# Patient Record
Sex: MALE | Race: WHITE | NOT HISPANIC OR LATINO
[De-identification: names, ages, dates, MRNs, and addresses within clinical notes are randomized per-mention and may not be internally consistent; named-entity substitution may affect disease eponyms.]

---

## 2017-06-07 ENCOUNTER — MEDICATION RENEWAL (OUTPATIENT)
Age: 57
End: 2017-06-07

## 2017-06-09 ENCOUNTER — APPOINTMENT (OUTPATIENT)
Dept: INTERNAL MEDICINE | Facility: CLINIC | Age: 57
End: 2017-06-09

## 2017-06-09 ENCOUNTER — LABORATORY RESULT (OUTPATIENT)
Age: 57
End: 2017-06-09

## 2017-06-09 VITALS — SYSTOLIC BLOOD PRESSURE: 132 MMHG | DIASTOLIC BLOOD PRESSURE: 84 MMHG

## 2017-06-09 VITALS
TEMPERATURE: 98.5 F | WEIGHT: 268 LBS | SYSTOLIC BLOOD PRESSURE: 138 MMHG | RESPIRATION RATE: 14 BRPM | DIASTOLIC BLOOD PRESSURE: 88 MMHG | BODY MASS INDEX: 36.3 KG/M2 | HEIGHT: 72 IN | OXYGEN SATURATION: 98 % | HEART RATE: 75 BPM

## 2017-06-13 LAB
ALBUMIN SERPL ELPH-MCNC: 4.9 G/DL
ALP BLD-CCNC: 85 U/L
ALT SERPL-CCNC: 43 U/L
ANION GAP SERPL CALC-SCNC: 17 MMOL/L
APPEARANCE: ABNORMAL
AST SERPL-CCNC: 26 U/L
BASOPHILS # BLD AUTO: 0.03 K/UL
BASOPHILS NFR BLD AUTO: 0.4 %
BILIRUB SERPL-MCNC: 0.8 MG/DL
BILIRUBIN URINE: NEGATIVE
BLOOD URINE: NEGATIVE
BUN SERPL-MCNC: 17 MG/DL
CALCIUM SERPL-MCNC: 9.9 MG/DL
CHLORIDE SERPL-SCNC: 101 MMOL/L
CHOLEST SERPL-MCNC: 153 MG/DL
CHOLEST/HDLC SERPL: 3.7 RATIO
CO2 SERPL-SCNC: 23 MMOL/L
COLOR: YELLOW
CREAT SERPL-MCNC: 0.9 MG/DL
EOSINOPHIL # BLD AUTO: 0.11 K/UL
EOSINOPHIL NFR BLD AUTO: 1.4 %
ESTIMATED AVERAGE GLUCOSE: 174 MG/DL
FOLATE SERPL-MCNC: 16 NG/ML
GLUCOSE QUALITATIVE U: 500 MG/DL
GLUCOSE SERPL-MCNC: 219 MG/DL
HBA1C MFR BLD HPLC: 7.7 %
HCT VFR BLD CALC: 43.6 %
HDLC SERPL-MCNC: 41 MG/DL
HGB BLD-MCNC: 14.5 G/DL
IMM GRANULOCYTES NFR BLD AUTO: 0.4 %
KETONES URINE: ABNORMAL
LDLC SERPL CALC-MCNC: 68 MG/DL
LEUKOCYTE ESTERASE URINE: NEGATIVE
LYMPHOCYTES # BLD AUTO: 2.28 K/UL
LYMPHOCYTES NFR BLD AUTO: 28.9 %
MAN DIFF?: NORMAL
MCHC RBC-ENTMCNC: 28.7 PG
MCHC RBC-ENTMCNC: 33.3 GM/DL
MCV RBC AUTO: 86.2 FL
MONOCYTES # BLD AUTO: 0.6 K/UL
MONOCYTES NFR BLD AUTO: 7.6 %
NEUTROPHILS # BLD AUTO: 4.83 K/UL
NEUTROPHILS NFR BLD AUTO: 61.3 %
NITRITE URINE: NEGATIVE
PH URINE: 5.5
PLATELET # BLD AUTO: 256 K/UL
POTASSIUM SERPL-SCNC: 4.5 MMOL/L
PROT SERPL-MCNC: 7.6 G/DL
PROTEIN URINE: ABNORMAL MG/DL
PSA SERPL-MCNC: 0.37 NG/ML
RBC # BLD: 5.06 M/UL
RBC # FLD: 13.6 %
SODIUM SERPL-SCNC: 141 MMOL/L
SPECIFIC GRAVITY URINE: 1.03
TRIGL SERPL-MCNC: 219 MG/DL
TSH SERPL-ACNC: 1.17 UIU/ML
UROBILINOGEN URINE: NORMAL MG/DL
VIT B12 SERPL-MCNC: 557 PG/ML
WBC # FLD AUTO: 7.88 K/UL

## 2017-07-12 ENCOUNTER — MEDICATION RENEWAL (OUTPATIENT)
Age: 57
End: 2017-07-12

## 2017-08-14 ENCOUNTER — MEDICATION RENEWAL (OUTPATIENT)
Age: 57
End: 2017-08-14

## 2017-08-29 ENCOUNTER — APPOINTMENT (OUTPATIENT)
Dept: ENDOCRINOLOGY | Facility: CLINIC | Age: 57
End: 2017-08-29

## 2017-09-09 ENCOUNTER — RX RENEWAL (OUTPATIENT)
Age: 57
End: 2017-09-09

## 2017-09-11 ENCOUNTER — MEDICATION RENEWAL (OUTPATIENT)
Age: 57
End: 2017-09-11

## 2017-09-18 ENCOUNTER — RX RENEWAL (OUTPATIENT)
Age: 57
End: 2017-09-18

## 2017-09-29 ENCOUNTER — APPOINTMENT (OUTPATIENT)
Dept: ENDOCRINOLOGY | Facility: CLINIC | Age: 57
End: 2017-09-29
Payer: COMMERCIAL

## 2017-09-29 VITALS
OXYGEN SATURATION: 97 % | SYSTOLIC BLOOD PRESSURE: 130 MMHG | BODY MASS INDEX: 35.89 KG/M2 | WEIGHT: 265 LBS | DIASTOLIC BLOOD PRESSURE: 90 MMHG | HEIGHT: 72 IN | HEART RATE: 73 BPM

## 2017-09-29 LAB
GLUCOSE BLDC GLUCOMTR-MCNC: 227
HBA1C MFR BLD HPLC: 6.3

## 2017-09-29 PROCEDURE — 90686 IIV4 VACC NO PRSV 0.5 ML IM: CPT

## 2017-09-29 PROCEDURE — G0008: CPT

## 2017-09-29 PROCEDURE — 83036 HEMOGLOBIN GLYCOSYLATED A1C: CPT | Mod: QW

## 2017-09-29 PROCEDURE — 99205 OFFICE O/P NEW HI 60 MIN: CPT | Mod: 25

## 2017-09-29 PROCEDURE — 82962 GLUCOSE BLOOD TEST: CPT

## 2017-09-29 RX ORDER — LANCETS
EACH MISCELLANEOUS
Qty: 2 | Refills: 5 | Status: ACTIVE | COMMUNITY
Start: 2017-09-29 | End: 1900-01-01

## 2017-09-29 RX ORDER — BLOOD SUGAR DIAGNOSTIC
STRIP MISCELLANEOUS
Qty: 2 | Refills: 6 | Status: ACTIVE | COMMUNITY
Start: 2017-09-29 | End: 1900-01-01

## 2017-09-29 RX ORDER — LANCING DEVICE/LANCETS
KIT MISCELLANEOUS
Qty: 1 | Refills: 0 | Status: ACTIVE | COMMUNITY
Start: 2017-09-29 | End: 1900-01-01

## 2017-09-29 RX ORDER — LANCETS
EACH MISCELLANEOUS
Qty: 1 | Refills: 6 | Status: ACTIVE | COMMUNITY
Start: 2017-09-29 | End: 1900-01-01

## 2017-10-04 ENCOUNTER — RESULT REVIEW (OUTPATIENT)
Age: 57
End: 2017-10-04

## 2017-10-04 LAB
CREAT SPEC-SCNC: 113 MG/DL
MICROALBUMIN 24H UR DL<=1MG/L-MCNC: 6.5 MG/DL
MICROALBUMIN/CREAT 24H UR-RTO: 58 MG/G

## 2017-10-09 ENCOUNTER — MEDICATION RENEWAL (OUTPATIENT)
Age: 57
End: 2017-10-09

## 2017-10-09 ENCOUNTER — RX RENEWAL (OUTPATIENT)
Age: 57
End: 2017-10-09

## 2017-10-26 ENCOUNTER — RESULT REVIEW (OUTPATIENT)
Age: 57
End: 2017-10-26

## 2017-10-27 ENCOUNTER — APPOINTMENT (OUTPATIENT)
Dept: INTERNAL MEDICINE | Facility: CLINIC | Age: 57
End: 2017-10-27
Payer: COMMERCIAL

## 2017-10-27 ENCOUNTER — LABORATORY RESULT (OUTPATIENT)
Age: 57
End: 2017-10-27

## 2017-10-27 ENCOUNTER — NON-APPOINTMENT (OUTPATIENT)
Age: 57
End: 2017-10-27

## 2017-10-27 VITALS — DIASTOLIC BLOOD PRESSURE: 80 MMHG | SYSTOLIC BLOOD PRESSURE: 130 MMHG

## 2017-10-27 VITALS
RESPIRATION RATE: 14 BRPM | TEMPERATURE: 98 F | DIASTOLIC BLOOD PRESSURE: 90 MMHG | BODY MASS INDEX: 34.67 KG/M2 | SYSTOLIC BLOOD PRESSURE: 140 MMHG | WEIGHT: 256 LBS | HEART RATE: 73 BPM | HEIGHT: 72 IN | OXYGEN SATURATION: 98 %

## 2017-10-27 LAB
APPEARANCE: CLEAR
BILIRUBIN URINE: NEGATIVE
BLOOD URINE: NEGATIVE
COLOR: YELLOW
GLUCOSE QUALITATIVE U: NEGATIVE MG/DL
KETONES URINE: NEGATIVE
LEUKOCYTE ESTERASE URINE: NEGATIVE
NITRITE URINE: NEGATIVE
PH URINE: 5
PROTEIN URINE: ABNORMAL MG/DL
SPECIFIC GRAVITY URINE: 1.02
UROBILINOGEN URINE: NEGATIVE MG/DL

## 2017-10-27 PROCEDURE — 36415 COLL VENOUS BLD VENIPUNCTURE: CPT

## 2017-10-27 PROCEDURE — 99396 PREV VISIT EST AGE 40-64: CPT | Mod: 25

## 2017-10-27 PROCEDURE — 93000 ELECTROCARDIOGRAM COMPLETE: CPT

## 2017-10-29 LAB
ALBUMIN SERPL ELPH-MCNC: 4.7 G/DL
ALP BLD-CCNC: 75 U/L
ALT SERPL-CCNC: 39 U/L
ANION GAP SERPL CALC-SCNC: 18 MMOL/L
AST SERPL-CCNC: 31 U/L
BASOPHILS # BLD AUTO: 0.02 K/UL
BASOPHILS NFR BLD AUTO: 0.3 %
BILIRUB SERPL-MCNC: 0.8 MG/DL
BUN SERPL-MCNC: 17 MG/DL
CALCIUM SERPL-MCNC: 10.5 MG/DL
CHLORIDE SERPL-SCNC: 100 MMOL/L
CHOLEST SERPL-MCNC: 148 MG/DL
CHOLEST/HDLC SERPL: 3 RATIO
CO2 SERPL-SCNC: 24 MMOL/L
CREAT SERPL-MCNC: 1.01 MG/DL
EOSINOPHIL # BLD AUTO: 0.3 K/UL
EOSINOPHIL NFR BLD AUTO: 4 %
ESTIMATED AVERAGE GLUCOSE: 128 MG/DL
FOLATE SERPL-MCNC: 14.3 NG/ML
GLUCOSE SERPL-MCNC: 160 MG/DL
HBA1C MFR BLD HPLC: 6.1 %
HCT VFR BLD CALC: 43.3 %
HDLC SERPL-MCNC: 49 MG/DL
HGB BLD-MCNC: 14.6 G/DL
IMM GRANULOCYTES NFR BLD AUTO: 0.5 %
LDLC SERPL CALC-MCNC: 74 MG/DL
LYMPHOCYTES # BLD AUTO: 1.99 K/UL
LYMPHOCYTES NFR BLD AUTO: 26.4 %
MAN DIFF?: NORMAL
MCHC RBC-ENTMCNC: 29.1 PG
MCHC RBC-ENTMCNC: 33.7 GM/DL
MCV RBC AUTO: 86.4 FL
MONOCYTES # BLD AUTO: 0.58 K/UL
MONOCYTES NFR BLD AUTO: 7.7 %
NEUTROPHILS # BLD AUTO: 4.61 K/UL
NEUTROPHILS NFR BLD AUTO: 61.1 %
PLATELET # BLD AUTO: 244 K/UL
POTASSIUM SERPL-SCNC: 4.4 MMOL/L
PROT SERPL-MCNC: 7.9 G/DL
PSA SERPL-MCNC: 0.44 NG/ML
RBC # BLD: 5.01 M/UL
RBC # FLD: 13.5 %
SODIUM SERPL-SCNC: 142 MMOL/L
TRIGL SERPL-MCNC: 126 MG/DL
TSH SERPL-ACNC: 2.29 UIU/ML
VIT B12 SERPL-MCNC: 575 PG/ML
WBC # FLD AUTO: 7.54 K/UL

## 2017-12-29 ENCOUNTER — APPOINTMENT (OUTPATIENT)
Dept: ENDOCRINOLOGY | Facility: CLINIC | Age: 57
End: 2017-12-29
Payer: COMMERCIAL

## 2017-12-29 VITALS
OXYGEN SATURATION: 98 % | DIASTOLIC BLOOD PRESSURE: 86 MMHG | WEIGHT: 253 LBS | BODY MASS INDEX: 34.27 KG/M2 | HEIGHT: 72 IN | HEART RATE: 85 BPM | SYSTOLIC BLOOD PRESSURE: 120 MMHG

## 2017-12-29 LAB — HBA1C MFR BLD HPLC: 6.5

## 2017-12-29 PROCEDURE — 99215 OFFICE O/P EST HI 40 MIN: CPT | Mod: 25

## 2017-12-29 PROCEDURE — 83036 HEMOGLOBIN GLYCOSYLATED A1C: CPT | Mod: QW

## 2018-02-05 ENCOUNTER — RX RENEWAL (OUTPATIENT)
Age: 58
End: 2018-02-05

## 2018-02-11 RX ORDER — DULAGLUTIDE 0.75 MG/.5ML
0.75 INJECTION, SOLUTION SUBCUTANEOUS
Qty: 1 | Refills: 0 | Status: DISCONTINUED | COMMUNITY
Start: 2017-09-29 | End: 2018-02-11

## 2018-02-11 RX ORDER — GLIPIZIDE 10 MG/1
10 TABLET, FILM COATED, EXTENDED RELEASE ORAL DAILY
Qty: 30 | Refills: 2 | Status: DISCONTINUED | COMMUNITY
Start: 2017-09-09 | End: 2018-02-11

## 2018-02-11 RX ORDER — GLIPIZIDE 2.5 MG/1
2.5 TABLET, FILM COATED, EXTENDED RELEASE ORAL
Qty: 120 | Refills: 2 | Status: DISCONTINUED | COMMUNITY
Start: 2017-10-26 | End: 2018-02-11

## 2018-03-15 ENCOUNTER — RX RENEWAL (OUTPATIENT)
Age: 58
End: 2018-03-15

## 2018-05-11 ENCOUNTER — APPOINTMENT (OUTPATIENT)
Dept: ENDOCRINOLOGY | Facility: CLINIC | Age: 58
End: 2018-05-11
Payer: COMMERCIAL

## 2018-05-11 VITALS
HEIGHT: 72 IN | SYSTOLIC BLOOD PRESSURE: 124 MMHG | OXYGEN SATURATION: 98 % | BODY MASS INDEX: 34.95 KG/M2 | DIASTOLIC BLOOD PRESSURE: 80 MMHG | WEIGHT: 258 LBS | HEART RATE: 76 BPM

## 2018-05-11 PROCEDURE — 83036 HEMOGLOBIN GLYCOSYLATED A1C: CPT | Mod: QW

## 2018-05-11 PROCEDURE — 82962 GLUCOSE BLOOD TEST: CPT

## 2018-05-11 PROCEDURE — 99214 OFFICE O/P EST MOD 30 MIN: CPT | Mod: 25

## 2018-05-11 RX ORDER — BLOOD-GLUCOSE METER
W/DEVICE KIT MISCELLANEOUS
Qty: 1 | Refills: 0 | Status: ACTIVE | COMMUNITY
Start: 2018-05-11 | End: 1900-01-01

## 2018-05-11 RX ORDER — LANCETS 33 GAUGE
EACH MISCELLANEOUS
Qty: 2 | Refills: 4 | Status: ACTIVE | COMMUNITY
Start: 2018-05-11 | End: 1900-01-01

## 2018-05-18 ENCOUNTER — APPOINTMENT (OUTPATIENT)
Dept: INTERNAL MEDICINE | Facility: CLINIC | Age: 58
End: 2018-05-18
Payer: COMMERCIAL

## 2018-05-18 VITALS
RESPIRATION RATE: 14 BRPM | HEIGHT: 72 IN | BODY MASS INDEX: 34.95 KG/M2 | SYSTOLIC BLOOD PRESSURE: 142 MMHG | HEART RATE: 89 BPM | OXYGEN SATURATION: 98 % | TEMPERATURE: 98.7 F | WEIGHT: 258 LBS | DIASTOLIC BLOOD PRESSURE: 82 MMHG

## 2018-05-18 VITALS — DIASTOLIC BLOOD PRESSURE: 76 MMHG | SYSTOLIC BLOOD PRESSURE: 132 MMHG

## 2018-05-18 PROCEDURE — 99214 OFFICE O/P EST MOD 30 MIN: CPT | Mod: 25

## 2018-05-18 PROCEDURE — 36415 COLL VENOUS BLD VENIPUNCTURE: CPT

## 2018-05-18 RX ORDER — BLOOD-GLUCOSE METER
KIT MISCELLANEOUS
Qty: 1 | Refills: 0 | Status: ACTIVE | COMMUNITY
Start: 2018-05-11

## 2018-05-18 NOTE — PLAN
[FreeTextEntry1] : Cardiology check-up at [facility]\par Screening colonoscopy after cardiac work-up if appropriate\par

## 2018-05-18 NOTE — HISTORY OF PRESENT ILLNESS
[de-identified] : Here for follow-up. Feeling well. Saw endocrinologist recently and glipizide-er was increased to  5mg 3tabs daily.\par Pt reports that his last cardiology check-up was about 5 years ago.

## 2018-05-19 LAB
ESTIMATED AVERAGE GLUCOSE: 126 MG/DL
HBA1C MFR BLD HPLC: 6 %

## 2018-05-23 LAB
ALBUMIN SERPL ELPH-MCNC: 4.9 G/DL
ALP BLD-CCNC: 71 U/L
ALT SERPL-CCNC: 23 U/L
ANION GAP SERPL CALC-SCNC: 18 MMOL/L
AST SERPL-CCNC: 22 U/L
BILIRUB SERPL-MCNC: 1 MG/DL
BUN SERPL-MCNC: 20 MG/DL
CALCIUM SERPL-MCNC: 10 MG/DL
CHLORIDE SERPL-SCNC: 99 MMOL/L
CHOLEST SERPL-MCNC: 134 MG/DL
CHOLEST/HDLC SERPL: 3.1 RATIO
CK SERPL-CCNC: 223 U/L
CO2 SERPL-SCNC: 25 MMOL/L
CREAT SERPL-MCNC: 0.74 MG/DL
CREAT SPEC-SCNC: 104 MG/DL
GLUCOSE BLDC GLUCOMTR-MCNC: 197
GLUCOSE SERPL-MCNC: 170 MG/DL
HBA1C MFR BLD HPLC: 6
HDLC SERPL-MCNC: 43 MG/DL
LDLC SERPL CALC-MCNC: 55 MG/DL
MICROALBUMIN 24H UR DL<=1MG/L-MCNC: 6.2 MG/DL
MICROALBUMIN/CREAT 24H UR-RTO: 60 MG/G
POTASSIUM SERPL-SCNC: 4.3 MMOL/L
PROT SERPL-MCNC: 7.7 G/DL
SODIUM SERPL-SCNC: 142 MMOL/L
TRIGL SERPL-MCNC: 181 MG/DL

## 2018-06-07 ENCOUNTER — RX RENEWAL (OUTPATIENT)
Age: 58
End: 2018-06-07

## 2018-06-15 ENCOUNTER — APPOINTMENT (OUTPATIENT)
Dept: CARDIOLOGY | Facility: CLINIC | Age: 58
End: 2018-06-15
Payer: COMMERCIAL

## 2018-06-15 ENCOUNTER — NON-APPOINTMENT (OUTPATIENT)
Age: 58
End: 2018-06-15

## 2018-06-15 VITALS
HEIGHT: 73 IN | BODY MASS INDEX: 34.59 KG/M2 | HEART RATE: 86 BPM | SYSTOLIC BLOOD PRESSURE: 154 MMHG | OXYGEN SATURATION: 96 % | WEIGHT: 261 LBS | DIASTOLIC BLOOD PRESSURE: 83 MMHG

## 2018-06-15 PROCEDURE — 99401 PREV MED CNSL INDIV APPRX 15: CPT

## 2018-06-15 PROCEDURE — 99205 OFFICE O/P NEW HI 60 MIN: CPT | Mod: 25

## 2018-06-15 PROCEDURE — 93000 ELECTROCARDIOGRAM COMPLETE: CPT

## 2018-07-02 ENCOUNTER — APPOINTMENT (OUTPATIENT)
Dept: CARDIOLOGY | Facility: CLINIC | Age: 58
End: 2018-07-02
Payer: COMMERCIAL

## 2018-07-02 PROCEDURE — 93306 TTE W/DOPPLER COMPLETE: CPT

## 2018-07-23 ENCOUNTER — APPOINTMENT (OUTPATIENT)
Dept: CARDIOLOGY | Facility: CLINIC | Age: 58
End: 2018-07-23
Payer: COMMERCIAL

## 2018-07-23 PROCEDURE — 93015 CV STRESS TEST SUPVJ I&R: CPT

## 2018-07-23 PROCEDURE — 78452 HT MUSCLE IMAGE SPECT MULT: CPT

## 2018-07-23 PROCEDURE — A9500: CPT

## 2018-07-26 ENCOUNTER — APPOINTMENT (OUTPATIENT)
Dept: CARDIOLOGY | Facility: CLINIC | Age: 58
End: 2018-07-26
Payer: COMMERCIAL

## 2018-07-26 VITALS
HEIGHT: 73 IN | HEART RATE: 79 BPM | OXYGEN SATURATION: 96 % | BODY MASS INDEX: 34.33 KG/M2 | WEIGHT: 259 LBS | SYSTOLIC BLOOD PRESSURE: 144 MMHG | DIASTOLIC BLOOD PRESSURE: 81 MMHG

## 2018-07-26 PROCEDURE — 99214 OFFICE O/P EST MOD 30 MIN: CPT

## 2018-08-01 ENCOUNTER — RESULT REVIEW (OUTPATIENT)
Age: 58
End: 2018-08-01

## 2018-08-02 ENCOUNTER — APPOINTMENT (OUTPATIENT)
Dept: CARDIOLOGY | Facility: CLINIC | Age: 58
End: 2018-08-02

## 2018-08-02 ENCOUNTER — APPOINTMENT (OUTPATIENT)
Dept: CARDIOLOGY | Facility: CLINIC | Age: 58
End: 2018-08-02
Payer: COMMERCIAL

## 2018-08-02 PROCEDURE — 93224 XTRNL ECG REC UP TO 48 HRS: CPT

## 2018-08-09 ENCOUNTER — RX RENEWAL (OUTPATIENT)
Age: 58
End: 2018-08-09

## 2018-08-28 ENCOUNTER — APPOINTMENT (OUTPATIENT)
Dept: ENDOCRINOLOGY | Facility: CLINIC | Age: 58
End: 2018-08-28
Payer: COMMERCIAL

## 2018-08-28 VITALS
HEART RATE: 88 BPM | HEIGHT: 73 IN | TEMPERATURE: 97.7 F | DIASTOLIC BLOOD PRESSURE: 67 MMHG | BODY MASS INDEX: 33.8 KG/M2 | WEIGHT: 255 LBS | SYSTOLIC BLOOD PRESSURE: 126 MMHG

## 2018-08-28 LAB
GLUCOSE BLDC GLUCOMTR-MCNC: 133
HBA1C MFR BLD HPLC: 6

## 2018-08-28 PROCEDURE — 83036 HEMOGLOBIN GLYCOSYLATED A1C: CPT | Mod: QW

## 2018-08-28 PROCEDURE — 82962 GLUCOSE BLOOD TEST: CPT

## 2018-08-28 PROCEDURE — 99213 OFFICE O/P EST LOW 20 MIN: CPT | Mod: 25

## 2018-09-07 LAB
ALBUMIN SERPL ELPH-MCNC: 5 G/DL
ALP BLD-CCNC: 72 U/L
ALT SERPL-CCNC: 22 U/L
ANION GAP SERPL CALC-SCNC: 19 MMOL/L
AST SERPL-CCNC: 24 U/L
BILIRUB SERPL-MCNC: 1 MG/DL
BUN SERPL-MCNC: 40 MG/DL
CALCIUM SERPL-MCNC: 11.3 MG/DL
CHLORIDE SERPL-SCNC: 97 MMOL/L
CO2 SERPL-SCNC: 28 MMOL/L
CREAT SERPL-MCNC: 1.08 MG/DL
GLUCOSE SERPL-MCNC: 136 MG/DL
HBA1C MFR BLD HPLC: 5.9 %
POTASSIUM SERPL-SCNC: 3.9 MMOL/L
PROT SERPL-MCNC: 8 G/DL
SODIUM SERPL-SCNC: 144 MMOL/L
T4 FREE SERPL-MCNC: 1.6 NG/DL
THYROGLOB AB SERPL-ACNC: <20 IU/ML
THYROPEROXIDASE AB SERPL IA-ACNC: 10.6 IU/ML
TSH SERPL-ACNC: 1.09 UIU/ML

## 2018-10-09 ENCOUNTER — NON-APPOINTMENT (OUTPATIENT)
Age: 58
End: 2018-10-09

## 2018-10-09 ENCOUNTER — APPOINTMENT (OUTPATIENT)
Dept: CARDIOLOGY | Facility: CLINIC | Age: 58
End: 2018-10-09
Payer: COMMERCIAL

## 2018-10-09 VITALS
DIASTOLIC BLOOD PRESSURE: 79 MMHG | HEIGHT: 73 IN | SYSTOLIC BLOOD PRESSURE: 163 MMHG | OXYGEN SATURATION: 94 % | WEIGHT: 268 LBS | HEART RATE: 88 BPM | BODY MASS INDEX: 35.52 KG/M2

## 2018-10-09 VITALS — SYSTOLIC BLOOD PRESSURE: 146 MMHG | DIASTOLIC BLOOD PRESSURE: 80 MMHG

## 2018-10-09 PROCEDURE — 99214 OFFICE O/P EST MOD 30 MIN: CPT

## 2018-10-09 PROCEDURE — 93000 ELECTROCARDIOGRAM COMPLETE: CPT

## 2018-10-09 RX ORDER — LOSARTAN POTASSIUM AND HYDROCHLOROTHIAZIDE 25; 100 MG/1; MG/1
100-25 TABLET ORAL DAILY
Qty: 90 | Refills: 1 | Status: DISCONTINUED | COMMUNITY
Start: 2018-08-01 | End: 2018-10-09

## 2018-11-02 ENCOUNTER — APPOINTMENT (OUTPATIENT)
Dept: INTERNAL MEDICINE | Facility: CLINIC | Age: 58
End: 2018-11-02
Payer: COMMERCIAL

## 2018-11-02 VITALS
SYSTOLIC BLOOD PRESSURE: 140 MMHG | HEART RATE: 94 BPM | DIASTOLIC BLOOD PRESSURE: 82 MMHG | TEMPERATURE: 97.8 F | RESPIRATION RATE: 14 BRPM | BODY MASS INDEX: 34.96 KG/M2 | WEIGHT: 265 LBS | OXYGEN SATURATION: 98 %

## 2018-11-02 VITALS — SYSTOLIC BLOOD PRESSURE: 140 MMHG | DIASTOLIC BLOOD PRESSURE: 80 MMHG

## 2018-11-02 PROCEDURE — 99396 PREV VISIT EST AGE 40-64: CPT | Mod: 25

## 2018-11-02 PROCEDURE — 36415 COLL VENOUS BLD VENIPUNCTURE: CPT

## 2018-11-02 NOTE — HISTORY OF PRESENT ILLNESS
[de-identified] : Here for annual physical. Overall feeling well. Pt c/o left shoulder discomfort with certain motions over the past year. Seems to be getting worse.

## 2018-11-02 NOTE — PLAN
[FreeTextEntry1] : See Orthopedist regarding shoulder pain.\par Has follow up appt with Dr. Thapa, Cardiology, next month\par See Diabetes Educator\par Recommend weight loss, improved exercise\par Recommend screening colonoscopy, Dr. Hernandez, GI

## 2018-11-07 LAB
ALBUMIN SERPL ELPH-MCNC: 4.8 G/DL
ALP BLD-CCNC: 69 U/L
ALT SERPL-CCNC: 39 U/L
ANION GAP SERPL CALC-SCNC: 16 MMOL/L
APPEARANCE: CLEAR
AST SERPL-CCNC: 32 U/L
BASOPHILS # BLD AUTO: 0.01 K/UL
BASOPHILS NFR BLD AUTO: 0.1 %
BILIRUB SERPL-MCNC: 0.8 MG/DL
BILIRUBIN URINE: NEGATIVE
BLOOD URINE: NEGATIVE
BUN SERPL-MCNC: 22 MG/DL
CALCIUM SERPL-MCNC: 10.2 MG/DL
CHLORIDE SERPL-SCNC: 102 MMOL/L
CHOLEST SERPL-MCNC: 150 MG/DL
CHOLEST/HDLC SERPL: 3.3 RATIO
CO2 SERPL-SCNC: 26 MMOL/L
COLOR: YELLOW
CREAT SERPL-MCNC: 0.91 MG/DL
EOSINOPHIL # BLD AUTO: 0.15 K/UL
EOSINOPHIL NFR BLD AUTO: 2.1 %
ESTIMATED AVERAGE GLUCOSE: 134 MG/DL
FOLATE SERPL-MCNC: 18.3 NG/ML
GLUCOSE QUALITATIVE U: NEGATIVE MG/DL
GLUCOSE SERPL-MCNC: 155 MG/DL
HBA1C MFR BLD HPLC: 6.3 %
HCT VFR BLD CALC: 40.3 %
HDLC SERPL-MCNC: 46 MG/DL
HGB BLD-MCNC: 13.7 G/DL
IMM GRANULOCYTES NFR BLD AUTO: 0.4 %
KETONES URINE: NEGATIVE
LDLC SERPL CALC-MCNC: 79 MG/DL
LEUKOCYTE ESTERASE URINE: NEGATIVE
LYMPHOCYTES # BLD AUTO: 2.23 K/UL
LYMPHOCYTES NFR BLD AUTO: 30.5 %
MAN DIFF?: NORMAL
MCHC RBC-ENTMCNC: 29.1 PG
MCHC RBC-ENTMCNC: 34 GM/DL
MCV RBC AUTO: 85.6 FL
MONOCYTES # BLD AUTO: 0.47 K/UL
MONOCYTES NFR BLD AUTO: 6.4 %
NEUTROPHILS # BLD AUTO: 4.42 K/UL
NEUTROPHILS NFR BLD AUTO: 60.5 %
NITRITE URINE: NEGATIVE
PH URINE: 5.5
PLATELET # BLD AUTO: 254 K/UL
POTASSIUM SERPL-SCNC: 4.8 MMOL/L
PROT SERPL-MCNC: 7.7 G/DL
PROTEIN URINE: NEGATIVE MG/DL
PSA SERPL-MCNC: 0.4 NG/ML
RBC # BLD: 4.71 M/UL
RBC # FLD: 13.9 %
SODIUM SERPL-SCNC: 144 MMOL/L
SPECIFIC GRAVITY URINE: 1.02
TRIGL SERPL-MCNC: 125 MG/DL
TSH SERPL-ACNC: 1.42 UIU/ML
UROBILINOGEN URINE: NEGATIVE MG/DL
VIT B12 SERPL-MCNC: 792 PG/ML
WBC # FLD AUTO: 7.31 K/UL

## 2018-11-08 ENCOUNTER — RX RENEWAL (OUTPATIENT)
Age: 58
End: 2018-11-08

## 2018-12-03 ENCOUNTER — APPOINTMENT (OUTPATIENT)
Dept: ENDOCRINOLOGY | Facility: CLINIC | Age: 58
End: 2018-12-03
Payer: COMMERCIAL

## 2018-12-03 ENCOUNTER — RX RENEWAL (OUTPATIENT)
Age: 58
End: 2018-12-03

## 2018-12-03 VITALS
SYSTOLIC BLOOD PRESSURE: 140 MMHG | WEIGHT: 269 LBS | DIASTOLIC BLOOD PRESSURE: 90 MMHG | HEART RATE: 110 BPM | BODY MASS INDEX: 35.65 KG/M2 | OXYGEN SATURATION: 97 % | HEIGHT: 73 IN

## 2018-12-03 LAB — GLUCOSE BLDC GLUCOMTR-MCNC: 186

## 2018-12-03 PROCEDURE — 82962 GLUCOSE BLOOD TEST: CPT

## 2018-12-03 PROCEDURE — 99214 OFFICE O/P EST MOD 30 MIN: CPT | Mod: 25

## 2018-12-11 ENCOUNTER — RX RENEWAL (OUTPATIENT)
Age: 58
End: 2018-12-11

## 2018-12-14 ENCOUNTER — APPOINTMENT (OUTPATIENT)
Dept: CARDIOLOGY | Facility: CLINIC | Age: 58
End: 2018-12-14
Payer: COMMERCIAL

## 2018-12-14 ENCOUNTER — NON-APPOINTMENT (OUTPATIENT)
Age: 58
End: 2018-12-14

## 2018-12-14 VITALS
SYSTOLIC BLOOD PRESSURE: 121 MMHG | WEIGHT: 268 LBS | BODY MASS INDEX: 35.52 KG/M2 | HEART RATE: 92 BPM | DIASTOLIC BLOOD PRESSURE: 78 MMHG | OXYGEN SATURATION: 98 % | HEIGHT: 73 IN

## 2018-12-14 PROCEDURE — 99214 OFFICE O/P EST MOD 30 MIN: CPT

## 2018-12-14 PROCEDURE — 93000 ELECTROCARDIOGRAM COMPLETE: CPT

## 2018-12-14 NOTE — CARDIOLOGY SUMMARY
[No Ischemia] : no Ischemia [___] : [unfilled] [None] : no pulmonary hypertension [Normal] : normal LA size [Mild] : mild mitral regurgitation [___] : [unfilled]

## 2018-12-14 NOTE — DISCUSSION/SUMMARY
[FreeTextEntry1] : This is a 58 year old man with a history of obesity, diabetes, hypertension, and hyperlipidemia who presents to the office for a follow up visit.  He is doing fine off of acebutolol, and will continue it.  His pressure is now controlled on olmesartan/HCTZ, and he will continue it.  He will continue amlodipine 10 QD.  He will continue a baby aspirin for primary prevention.  He will continue atorvastatin, and his LDL is at goal.   We spent some time discussing that he needs to lose weight and start exercising.  I will see him back in six months.  He knows to call the office with any issues.

## 2018-12-14 NOTE — HISTORY OF PRESENT ILLNESS
[FreeTextEntry1] : This is a 58 year old man with a history of obesity, diabetes, hypertension, and hyperlipidemia who presents to the office for a follow up visit.  He has a history of frequent PVCs, and had been on acebutolol for years.  I stopped it, and a Holter showed insignificant PVCs and no NSVT.  He reports no symptoms of chest pain, SEGURA, PND, orthopnea, LE swelling, dizziness, or syncope.  At times he gets very mild palpitations, but they are not bothersome at all.  He has not lost any weight since his last visit.  He does not exercise regularly, and knows that he needs to start to do so.  His blood pressure is well controlled.

## 2018-12-14 NOTE — PHYSICAL EXAM
[Normal Appearance] : normal appearance [General Appearance - In No Acute Distress] : no acute distress [Normal Conjunctiva] : the conjunctiva exhibited no abnormalities [Normal Oral Mucosa] : normal oral mucosa [Normal Jugular Venous V Waves Present] : normal jugular venous V waves present [Respiration, Rhythm And Depth] : normal respiratory rhythm and effort [Exaggerated Use Of Accessory Muscles For Inspiration] : no accessory muscle use [Auscultation Breath Sounds / Voice Sounds] : lungs were clear to auscultation bilaterally [Bowel Sounds] : normal bowel sounds [Abdomen Soft] : soft [Abdomen Tenderness] : non-tender [Abnormal Walk] : normal gait [Gait - Sufficient For Exercise Testing] : the gait was sufficient for exercise testing [Nail Clubbing] : no clubbing of the fingernails [Cyanosis, Localized] : no localized cyanosis [Petechial Hemorrhages (___cm)] : no petechial hemorrhages [Skin Color & Pigmentation] : normal skin color and pigmentation [] : no rash [No Venous Stasis] : no venous stasis [Skin Lesions] : no skin lesions [Oriented To Time, Place, And Person] : oriented to person, place, and time [Affect] : the affect was normal [Mood] : the mood was normal [No Anxiety] : not feeling anxious [Not Palpable] : not palpable [Normal Rate] : normal [Premature Beats] : regular with premature beats [Normal S1] : normal S1 [Normal S2] : normal S2 [No Gallop] : no gallop heard [No Murmur] : no murmurs heard [1+] : left 1+ [No Abnormalities] : the abdominal aorta was not enlarged and no bruit was heard [No Pitting Edema] : no pitting edema present [FreeTextEntry1] : No JVD [Right Carotid Bruit] : no bruit heard over the right carotid [Left Carotid Bruit] : no bruit heard over the left carotid [Bruit] : no bruit heard

## 2019-01-13 ENCOUNTER — LABORATORY RESULT (OUTPATIENT)
Age: 59
End: 2019-01-13

## 2019-03-02 ENCOUNTER — TRANSCRIPTION ENCOUNTER (OUTPATIENT)
Age: 59
End: 2019-03-02

## 2019-03-05 ENCOUNTER — APPOINTMENT (OUTPATIENT)
Dept: ENDOCRINOLOGY | Facility: CLINIC | Age: 59
End: 2019-03-05
Payer: COMMERCIAL

## 2019-03-05 PROCEDURE — 83036 HEMOGLOBIN GLYCOSYLATED A1C: CPT | Mod: QW

## 2019-03-05 PROCEDURE — 99214 OFFICE O/P EST MOD 30 MIN: CPT | Mod: 25

## 2019-03-05 PROCEDURE — 36415 COLL VENOUS BLD VENIPUNCTURE: CPT

## 2019-03-05 PROCEDURE — 82962 GLUCOSE BLOOD TEST: CPT

## 2019-03-06 NOTE — ASSESSMENT
[FreeTextEntry1] : 60 yo M with PMH DM2, HTN, HLD \par \par 1. DM2  change trulicity to ozempic 1 mgq week. continue metformin and glipizide.  Continue ARB and statin.send in log in 1 week (samples of farxiga 5 mg daily given to use as indicated)\par \par 2. HTN - manual /76. continue olmesartan/HCTZ and amlodipine. will consult with cardiology as to whether to start coreg 6.25 mg BID\par \par 3. HLD - continue atorvastatin.\par \par RV 3 months

## 2019-03-06 NOTE — HISTORY OF PRESENT ILLNESS
[FreeTextEntry1] : 59 yo M with PMH DM2, HTN, HLD \par \par Diagnosed with DM2 when 44 yrs old.\par Microvascular complications: + nephropathy\par After initial visit to endocrinology tradjenta was discontinued and he was started on trulicity.\par He is on Trulicity 1.5 mg q week,  glipizide ER 5 mg 4 tablets qd with metformin 1000 mg BID.\par Last ophthalmologist visit : within the year - no retinopathy\par Please refer to scanned glucose log\par B: atkins bar// glucerna bar/ cheerios\par on weekend glucerna/atkins bar\par L: wonton soup//salmon on bagel\par 6PM D: cken occasionally fried + 1 piece of bread + veg\par post dinner snack atkins/pear/ apple/ nuts / cheese\par \par

## 2019-03-08 ENCOUNTER — RX RENEWAL (OUTPATIENT)
Age: 59
End: 2019-03-08

## 2019-03-12 ENCOUNTER — OTHER (OUTPATIENT)
Age: 59
End: 2019-03-12

## 2019-03-12 LAB
25(OH)D3 SERPL-MCNC: 19 NG/ML
ALBUMIN SERPL ELPH-MCNC: 5 G/DL
ALP BLD-CCNC: 87 U/L
ALT SERPL-CCNC: 51 U/L
ANION GAP SERPL CALC-SCNC: 15 MMOL/L
AST SERPL-CCNC: 32 U/L
BILIRUB SERPL-MCNC: 0.7 MG/DL
BUN SERPL-MCNC: 17 MG/DL
CALCIUM SERPL-MCNC: 10.1 MG/DL
CHLORIDE SERPL-SCNC: 101 MMOL/L
CO2 SERPL-SCNC: 25 MMOL/L
CREAT SERPL-MCNC: 0.7 MG/DL
GLUCOSE BLDC GLUCOMTR-MCNC: 194
GLUCOSE SERPL-MCNC: 186 MG/DL
HBA1C MFR BLD HPLC: 7.2
HBA1C MFR BLD HPLC: 7.4 %
POTASSIUM SERPL-SCNC: 4.4 MMOL/L
PROT SERPL-MCNC: 7.8 G/DL
SODIUM SERPL-SCNC: 141 MMOL/L

## 2019-03-21 ENCOUNTER — OTHER (OUTPATIENT)
Age: 59
End: 2019-03-21

## 2019-05-23 ENCOUNTER — RX RENEWAL (OUTPATIENT)
Age: 59
End: 2019-05-23

## 2019-06-05 ENCOUNTER — APPOINTMENT (OUTPATIENT)
Dept: ENDOCRINOLOGY | Facility: CLINIC | Age: 59
End: 2019-06-05
Payer: COMMERCIAL

## 2019-06-05 VITALS
HEART RATE: 88 BPM | WEIGHT: 260 LBS | OXYGEN SATURATION: 96 % | HEIGHT: 73 IN | DIASTOLIC BLOOD PRESSURE: 80 MMHG | BODY MASS INDEX: 34.46 KG/M2 | SYSTOLIC BLOOD PRESSURE: 126 MMHG

## 2019-06-05 PROCEDURE — 99214 OFFICE O/P EST MOD 30 MIN: CPT | Mod: 25

## 2019-06-05 PROCEDURE — 83036 HEMOGLOBIN GLYCOSYLATED A1C: CPT | Mod: QW

## 2019-06-05 PROCEDURE — 82962 GLUCOSE BLOOD TEST: CPT

## 2019-06-05 PROCEDURE — 36415 COLL VENOUS BLD VENIPUNCTURE: CPT

## 2019-06-05 RX ORDER — ERGOCALCIFEROL 1.25 MG/1
1.25 MG CAPSULE ORAL
Qty: 8 | Refills: 0 | Status: DISCONTINUED | COMMUNITY
Start: 2019-03-12 | End: 2019-06-05

## 2019-06-05 RX ORDER — DULAGLUTIDE 1.5 MG/.5ML
1.5 INJECTION, SOLUTION SUBCUTANEOUS
Qty: 3 | Refills: 0 | Status: DISCONTINUED | COMMUNITY
Start: 2017-10-26 | End: 2019-06-05

## 2019-06-10 NOTE — ASSESSMENT
[FreeTextEntry1] : 58 yo M with PMH DM2, HTN, HLD \par \par 1. DM2  - continue regimen as above.  Continue ARB and statin..consistent carb diet  advised.  (samples of farxiga 5 mg daily given - to be started as indicated)\par \par 2. HTN -  continue olmesartan/HCTZ,,amlodipine, and coreg\par \par 3. HLD - continue atorvastatin.\par \par RV 3 months

## 2019-06-10 NOTE — HISTORY OF PRESENT ILLNESS
[FreeTextEntry1] : 60 yo M with PMH DM2, HTN, HLD \par \par Diagnosed with DM2 when 44 yrs old.\par Microvascular complications: + nephropathy\par After initial visit to endocrinology tradjenta was discontinued and he was started on trulicity.\par He was changed to ozempic 1 mg in lieu of trulicity 1.5 mg q week after last visit,  also continues on glipizide ER 5 mg 4 tablets qd with metformin 1000 mg BID.\par variety in carb intake\par \par

## 2019-06-11 ENCOUNTER — RX RENEWAL (OUTPATIENT)
Age: 59
End: 2019-06-11

## 2019-06-12 ENCOUNTER — OTHER (OUTPATIENT)
Age: 59
End: 2019-06-12

## 2019-06-12 LAB
25(OH)D3 SERPL-MCNC: 41 NG/ML
ALBUMIN SERPL ELPH-MCNC: 5 G/DL
ALP BLD-CCNC: 70 U/L
ALT SERPL-CCNC: 42 U/L
ANION GAP SERPL CALC-SCNC: 15 MMOL/L
AST SERPL-CCNC: 31 U/L
BILIRUB SERPL-MCNC: 0.9 MG/DL
BUN SERPL-MCNC: 19 MG/DL
CALCIUM SERPL-MCNC: 10.1 MG/DL
CHLORIDE SERPL-SCNC: 100 MMOL/L
CHOLEST SERPL-MCNC: 125 MG/DL
CHOLEST/HDLC SERPL: 3.1 RATIO
CK SERPL-CCNC: 345 U/L
CO2 SERPL-SCNC: 27 MMOL/L
CREAT SERPL-MCNC: 0.75 MG/DL
CREAT SPEC-SCNC: 117 MG/DL
ESTIMATED AVERAGE GLUCOSE: 120 MG/DL
GLUCOSE BLDC GLUCOMTR-MCNC: 136
GLUCOSE SERPL-MCNC: 127 MG/DL
HBA1C MFR BLD HPLC: 5.8 %
HBA1C MFR BLD HPLC: 6.1
HDLC SERPL-MCNC: 40 MG/DL
LDLC SERPL CALC-MCNC: 51 MG/DL
MICROALBUMIN 24H UR DL<=1MG/L-MCNC: 3.9 MG/DL
MICROALBUMIN/CREAT 24H UR-RTO: 33 MG/G
POTASSIUM SERPL-SCNC: 4.3 MMOL/L
PROT SERPL-MCNC: 7.6 G/DL
SODIUM SERPL-SCNC: 142 MMOL/L
TRIGL SERPL-MCNC: 172 MG/DL

## 2019-06-18 ENCOUNTER — RX RENEWAL (OUTPATIENT)
Age: 59
End: 2019-06-18

## 2019-07-07 ENCOUNTER — RX RENEWAL (OUTPATIENT)
Age: 59
End: 2019-07-07

## 2019-07-29 ENCOUNTER — RX RENEWAL (OUTPATIENT)
Age: 59
End: 2019-07-29

## 2019-08-02 ENCOUNTER — NON-APPOINTMENT (OUTPATIENT)
Age: 59
End: 2019-08-02

## 2019-08-02 ENCOUNTER — APPOINTMENT (OUTPATIENT)
Dept: OPHTHALMOLOGY | Facility: CLINIC | Age: 59
End: 2019-08-02
Payer: COMMERCIAL

## 2019-08-02 PROCEDURE — 76514 ECHO EXAM OF EYE THICKNESS: CPT

## 2019-08-02 PROCEDURE — 92004 COMPRE OPH EXAM NEW PT 1/>: CPT

## 2019-08-02 PROCEDURE — 92133 CPTRZD OPH DX IMG PST SGM ON: CPT

## 2019-08-02 PROCEDURE — 92015 DETERMINE REFRACTIVE STATE: CPT

## 2019-08-09 ENCOUNTER — RX RENEWAL (OUTPATIENT)
Age: 59
End: 2019-08-09

## 2019-08-26 ENCOUNTER — APPOINTMENT (OUTPATIENT)
Dept: CARDIOLOGY | Facility: CLINIC | Age: 59
End: 2019-08-26
Payer: COMMERCIAL

## 2019-08-26 ENCOUNTER — NON-APPOINTMENT (OUTPATIENT)
Age: 59
End: 2019-08-26

## 2019-08-26 VITALS
SYSTOLIC BLOOD PRESSURE: 126 MMHG | OXYGEN SATURATION: 97 % | DIASTOLIC BLOOD PRESSURE: 84 MMHG | BODY MASS INDEX: 34.33 KG/M2 | HEIGHT: 73 IN | WEIGHT: 259 LBS | HEART RATE: 89 BPM

## 2019-08-26 PROCEDURE — 99214 OFFICE O/P EST MOD 30 MIN: CPT

## 2019-08-26 PROCEDURE — 93000 ELECTROCARDIOGRAM COMPLETE: CPT

## 2019-09-06 ENCOUNTER — APPOINTMENT (OUTPATIENT)
Dept: OPHTHALMOLOGY | Facility: CLINIC | Age: 59
End: 2019-09-06
Payer: COMMERCIAL

## 2019-09-06 ENCOUNTER — NON-APPOINTMENT (OUTPATIENT)
Age: 59
End: 2019-09-06

## 2019-09-06 PROCEDURE — 92012 INTRM OPH EXAM EST PATIENT: CPT

## 2019-09-06 PROCEDURE — 92083 EXTENDED VISUAL FIELD XM: CPT

## 2019-09-10 ENCOUNTER — APPOINTMENT (OUTPATIENT)
Dept: ENDOCRINOLOGY | Facility: CLINIC | Age: 59
End: 2019-09-10
Payer: COMMERCIAL

## 2019-09-10 VITALS
SYSTOLIC BLOOD PRESSURE: 134 MMHG | HEIGHT: 73 IN | WEIGHT: 259 LBS | HEART RATE: 87 BPM | OXYGEN SATURATION: 97 % | BODY MASS INDEX: 34.33 KG/M2 | DIASTOLIC BLOOD PRESSURE: 72 MMHG

## 2019-09-10 LAB
GLUCOSE BLDC GLUCOMTR-MCNC: 154
HBA1C MFR BLD HPLC: 5.7

## 2019-09-10 PROCEDURE — 82962 GLUCOSE BLOOD TEST: CPT

## 2019-09-10 PROCEDURE — 99214 OFFICE O/P EST MOD 30 MIN: CPT | Mod: 25

## 2019-09-10 PROCEDURE — 83036 HEMOGLOBIN GLYCOSYLATED A1C: CPT | Mod: QW

## 2019-09-10 PROCEDURE — 36415 COLL VENOUS BLD VENIPUNCTURE: CPT

## 2019-09-10 NOTE — ASSESSMENT
[FreeTextEntry1] : 58 yo M with PMH DM2, HTN, HLD \par \par 1. DM2  - continue regimen as above.  Continue ARB and statin. consistent carb diet  advised.  \par \par 2. HTN -  continue olmesartan/HCTZ,,amlodipine, and coreg\par \par 3. HLD - continue atorvastatin.\par \par RV 3 months

## 2019-09-10 NOTE — HISTORY OF PRESENT ILLNESS
[FreeTextEntry1] : 60 yo M with PMH DM2, HTN, HLD \par \par Diagnosed with DM2 when 44 yrs old.\par Microvascular complications: + nephropathy\par After initial visit to endocrinology tradjenta was discontinued and he was started on trulicity.\par He was changed to ozempic 1 mg in lieu of trulicity 1.5 mg q week,  also continues on glipizide ER 5 mg 4 tablets qd with metformin 1000 mg BID.\par variety in carb intake - see sample food log as scanned.\par also refer to scanned glucose log.\par last ophthalmologist visit: 1 month ago\par last podiatrist visit: 1 month ago\par \par

## 2019-09-18 LAB
25(OH)D3 SERPL-MCNC: 35.2 NG/ML
ALBUMIN SERPL ELPH-MCNC: 4.6 G/DL
ALP BLD-CCNC: 74 U/L
ALT SERPL-CCNC: 32 U/L
ANION GAP SERPL CALC-SCNC: 16 MMOL/L
AST SERPL-CCNC: 25 U/L
BILIRUB SERPL-MCNC: 0.9 MG/DL
BUN SERPL-MCNC: 21 MG/DL
CALCIUM SERPL-MCNC: 9.8 MG/DL
CHLORIDE SERPL-SCNC: 103 MMOL/L
CO2 SERPL-SCNC: 23 MMOL/L
CREAT SERPL-MCNC: 0.78 MG/DL
ESTIMATED AVERAGE GLUCOSE: 117 MG/DL
GLUCOSE SERPL-MCNC: 172 MG/DL
HBA1C MFR BLD HPLC: 5.7 %
POTASSIUM SERPL-SCNC: 4.2 MMOL/L
PROT SERPL-MCNC: 7.3 G/DL
SODIUM SERPL-SCNC: 142 MMOL/L

## 2019-10-04 ENCOUNTER — RX RENEWAL (OUTPATIENT)
Age: 59
End: 2019-10-04

## 2019-10-07 ENCOUNTER — RX RENEWAL (OUTPATIENT)
Age: 59
End: 2019-10-07

## 2019-10-07 ENCOUNTER — MEDICATION RENEWAL (OUTPATIENT)
Age: 59
End: 2019-10-07

## 2019-10-28 ENCOUNTER — RX RENEWAL (OUTPATIENT)
Age: 59
End: 2019-10-28

## 2019-10-30 ENCOUNTER — OTHER (OUTPATIENT)
Age: 59
End: 2019-10-30

## 2019-11-05 ENCOUNTER — RX RENEWAL (OUTPATIENT)
Age: 59
End: 2019-11-05

## 2019-11-08 ENCOUNTER — NON-APPOINTMENT (OUTPATIENT)
Age: 59
End: 2019-11-08

## 2019-11-08 ENCOUNTER — APPOINTMENT (OUTPATIENT)
Dept: INTERNAL MEDICINE | Facility: CLINIC | Age: 59
End: 2019-11-08
Payer: COMMERCIAL

## 2019-11-08 VITALS
RESPIRATION RATE: 14 BRPM | HEART RATE: 83 BPM | HEIGHT: 73 IN | WEIGHT: 260 LBS | DIASTOLIC BLOOD PRESSURE: 74 MMHG | TEMPERATURE: 98.1 F | OXYGEN SATURATION: 96 % | SYSTOLIC BLOOD PRESSURE: 126 MMHG | BODY MASS INDEX: 34.46 KG/M2

## 2019-11-08 DIAGNOSIS — Z23 ENCOUNTER FOR IMMUNIZATION: ICD-10-CM

## 2019-11-08 PROCEDURE — 90686 IIV4 VACC NO PRSV 0.5 ML IM: CPT

## 2019-11-08 PROCEDURE — 36415 COLL VENOUS BLD VENIPUNCTURE: CPT

## 2019-11-08 PROCEDURE — G0008: CPT

## 2019-11-08 PROCEDURE — 99396 PREV VISIT EST AGE 40-64: CPT | Mod: 25

## 2019-11-08 PROCEDURE — 93000 ELECTROCARDIOGRAM COMPLETE: CPT

## 2019-11-08 NOTE — HEALTH RISK ASSESSMENT
[Good] : ~his/her~  mood as  good [Yes] : Yes [] : No [de-identified] : social [de-identified] : Exercises sporadiocally

## 2019-11-08 NOTE — PLAN
[FreeTextEntry1] : Strongly advised weight loss, improved diet/exercise\par Follow up with Endocrinology and Cardiology

## 2019-11-08 NOTE — PHYSICAL EXAM
[No Acute Distress] : no acute distress [Well Nourished] : well nourished [Well Developed] : well developed [Well-Appearing] : well-appearing [Normal Sclera/Conjunctiva] : normal sclera/conjunctiva [PERRL] : pupils equal round and reactive to light [EOMI] : extraocular movements intact [Normal Outer Ear/Nose] : the outer ears and nose were normal in appearance [Normal Oropharynx] : the oropharynx was normal [No Lymphadenopathy] : no lymphadenopathy [No JVD] : no jugular venous distention [Supple] : supple [Thyroid Normal, No Nodules] : the thyroid was normal and there were no nodules present [No Respiratory Distress] : no respiratory distress  [No Accessory Muscle Use] : no accessory muscle use [Clear to Auscultation] : lungs were clear to auscultation bilaterally [Normal Rate] : normal rate  [Regular Rhythm] : with a regular rhythm [Normal S1, S2] : normal S1 and S2 [No Murmur] : no murmur heard [No Carotid Bruits] : no carotid bruits [No Abdominal Bruit] : a ~M bruit was not heard ~T in the abdomen [No Varicosities] : no varicosities [Pedal Pulses Present] : the pedal pulses are present [No Edema] : there was no peripheral edema [No Palpable Aorta] : no palpable aorta [No Extremity Clubbing/Cyanosis] : no extremity clubbing/cyanosis [Soft] : abdomen soft [Non Tender] : non-tender [Non-distended] : non-distended [No Masses] : no abdominal mass palpated [No HSM] : no HSM [Normal Bowel Sounds] : normal bowel sounds [Normal Posterior Cervical Nodes] : no posterior cervical lymphadenopathy [Normal Anterior Cervical Nodes] : no anterior cervical lymphadenopathy [No CVA Tenderness] : no CVA  tenderness [No Spinal Tenderness] : no spinal tenderness [No Joint Swelling] : no joint swelling [Grossly Normal Strength/Tone] : grossly normal strength/tone [No Rash] : no rash [Coordination Grossly Intact] : coordination grossly intact [No Focal Deficits] : no focal deficits [Deep Tendon Reflexes (DTR)] : deep tendon reflexes were 2+ and symmetric [Normal Gait] : normal gait [Normal Affect] : the affect was normal [Normal Insight/Judgement] : insight and judgment were intact

## 2019-11-10 LAB
ALBUMIN SERPL ELPH-MCNC: 4.8 G/DL
ALP BLD-CCNC: 76 U/L
ALT SERPL-CCNC: 28 U/L
ANION GAP SERPL CALC-SCNC: 16 MMOL/L
APPEARANCE: CLEAR
AST SERPL-CCNC: 25 U/L
BASOPHILS # BLD AUTO: 0.05 K/UL
BASOPHILS NFR BLD AUTO: 0.5 %
BILIRUB SERPL-MCNC: 0.7 MG/DL
BILIRUBIN URINE: NEGATIVE
BLOOD URINE: NEGATIVE
BUN SERPL-MCNC: 17 MG/DL
CALCIUM SERPL-MCNC: 9.9 MG/DL
CHLORIDE SERPL-SCNC: 102 MMOL/L
CHOLEST SERPL-MCNC: 129 MG/DL
CHOLEST/HDLC SERPL: 2.9 RATIO
CO2 SERPL-SCNC: 26 MMOL/L
COLOR: YELLOW
CREAT SERPL-MCNC: 0.84 MG/DL
EOSINOPHIL # BLD AUTO: 0.25 K/UL
EOSINOPHIL NFR BLD AUTO: 2.6 %
ESTIMATED AVERAGE GLUCOSE: 120 MG/DL
FOLATE SERPL-MCNC: 15.7 NG/ML
GLUCOSE QUALITATIVE U: NEGATIVE
GLUCOSE SERPL-MCNC: 112 MG/DL
HBA1C MFR BLD HPLC: 5.8 %
HCT VFR BLD CALC: 42.3 %
HDLC SERPL-MCNC: 44 MG/DL
HGB BLD-MCNC: 13.7 G/DL
IMM GRANULOCYTES NFR BLD AUTO: 0.3 %
KETONES URINE: NEGATIVE
LDLC SERPL CALC-MCNC: 56 MG/DL
LEUKOCYTE ESTERASE URINE: NEGATIVE
LYMPHOCYTES # BLD AUTO: 2.32 K/UL
LYMPHOCYTES NFR BLD AUTO: 24 %
MAN DIFF?: NORMAL
MCHC RBC-ENTMCNC: 28.1 PG
MCHC RBC-ENTMCNC: 32.4 GM/DL
MCV RBC AUTO: 86.7 FL
MONOCYTES # BLD AUTO: 0.7 K/UL
MONOCYTES NFR BLD AUTO: 7.2 %
NEUTROPHILS # BLD AUTO: 6.31 K/UL
NEUTROPHILS NFR BLD AUTO: 65.4 %
NITRITE URINE: NEGATIVE
PH URINE: 5.5
PLATELET # BLD AUTO: 321 K/UL
POTASSIUM SERPL-SCNC: 4.2 MMOL/L
PROT SERPL-MCNC: 7.4 G/DL
PROTEIN URINE: NORMAL
PSA SERPL-MCNC: 0.43 NG/ML
RBC # BLD: 4.88 M/UL
RBC # FLD: 13.2 %
SODIUM SERPL-SCNC: 144 MMOL/L
SPECIFIC GRAVITY URINE: 1.03
TRIGL SERPL-MCNC: 145 MG/DL
TSH SERPL-ACNC: 1.2 UIU/ML
UROBILINOGEN URINE: NORMAL
VIT B12 SERPL-MCNC: 652 PG/ML
WBC # FLD AUTO: 9.66 K/UL

## 2019-11-27 ENCOUNTER — RX RENEWAL (OUTPATIENT)
Age: 59
End: 2019-11-27

## 2019-12-05 ENCOUNTER — RX RENEWAL (OUTPATIENT)
Age: 59
End: 2019-12-05

## 2019-12-11 ENCOUNTER — APPOINTMENT (OUTPATIENT)
Dept: OPHTHALMOLOGY | Facility: CLINIC | Age: 59
End: 2019-12-11
Payer: COMMERCIAL

## 2019-12-11 ENCOUNTER — NON-APPOINTMENT (OUTPATIENT)
Age: 59
End: 2019-12-11

## 2019-12-11 PROCEDURE — 92012 INTRM OPH EXAM EST PATIENT: CPT

## 2019-12-13 ENCOUNTER — APPOINTMENT (OUTPATIENT)
Dept: ENDOCRINOLOGY | Facility: CLINIC | Age: 59
End: 2019-12-13
Payer: COMMERCIAL

## 2019-12-13 VITALS
BODY MASS INDEX: 34.46 KG/M2 | OXYGEN SATURATION: 98 % | SYSTOLIC BLOOD PRESSURE: 135 MMHG | HEART RATE: 91 BPM | HEIGHT: 73 IN | DIASTOLIC BLOOD PRESSURE: 83 MMHG | WEIGHT: 260 LBS

## 2019-12-13 LAB — GLUCOSE BLDC GLUCOMTR-MCNC: 170

## 2019-12-13 PROCEDURE — 99214 OFFICE O/P EST MOD 30 MIN: CPT

## 2019-12-13 NOTE — ASSESSMENT
[FreeTextEntry1] : 60 yo M with PMH DM2, HTN, HLD \par \par 1. DM2  - continue regimen as above.  Continue ARB and statin. consistent carb diet  advised.  \par \par 2. HTN -  continue olmesartan/HCTZ,,amlodipine, and coreg\par \par 3. HLD - continue atorvastatin.\par \par RV 3 months

## 2019-12-13 NOTE — HISTORY OF PRESENT ILLNESS
[FreeTextEntry1] : 58 yo M with PMH DM2, HTN, HLD \par \par Diagnosed with DM2 when 44 yrs old.\par Microvascular complications: + nephropathy\par After initial visit to endocrinology tradjenta was discontinued and he was started on trulicity.\par He was changed to ozempic 1 mg in lieu of trulicity 1.5 mg q week,  also continues on glipizide ER 10 mg 2 tablets qd with metformin 1000 mg BID.\par variety in carb intake - see sample food log as scanned.\par also refer to scanned glucose log.\par last ophthalmologist visit: yesterday no retinopathy\par last podiatrist visit: summer 2019\par B: 2 toast SF jelly + glucerna + coffee\par L: SW diet soda\par D: meat + tomato and mozzarella\par post dinner snack : tea biscuits and cheese // SF treats// melon//grapes\par \par

## 2020-01-07 ENCOUNTER — RX RENEWAL (OUTPATIENT)
Age: 60
End: 2020-01-07

## 2020-02-01 ENCOUNTER — RX RENEWAL (OUTPATIENT)
Age: 60
End: 2020-02-01

## 2020-03-05 ENCOUNTER — APPOINTMENT (OUTPATIENT)
Dept: CARDIOLOGY | Facility: CLINIC | Age: 60
End: 2020-03-05
Payer: COMMERCIAL

## 2020-03-05 ENCOUNTER — NON-APPOINTMENT (OUTPATIENT)
Age: 60
End: 2020-03-05

## 2020-03-05 VITALS
BODY MASS INDEX: 34.72 KG/M2 | WEIGHT: 262 LBS | HEIGHT: 73 IN | HEART RATE: 75 BPM | DIASTOLIC BLOOD PRESSURE: 81 MMHG | SYSTOLIC BLOOD PRESSURE: 146 MMHG | OXYGEN SATURATION: 98 %

## 2020-03-05 PROCEDURE — 99214 OFFICE O/P EST MOD 30 MIN: CPT

## 2020-03-05 PROCEDURE — 93000 ELECTROCARDIOGRAM COMPLETE: CPT

## 2020-03-08 ENCOUNTER — RX RENEWAL (OUTPATIENT)
Age: 60
End: 2020-03-08

## 2020-03-13 ENCOUNTER — APPOINTMENT (OUTPATIENT)
Dept: OPHTHALMOLOGY | Facility: CLINIC | Age: 60
End: 2020-03-13

## 2020-04-06 RX ORDER — OLMESARTAN MEDOXOMIL AND HYDROCHLOROTHIAZIDE 40; 25 MG/1; MG/1
40-25 TABLET ORAL DAILY
Qty: 90 | Refills: 3 | Status: DISCONTINUED | COMMUNITY
Start: 2018-10-09 | End: 2020-04-06

## 2020-04-21 ENCOUNTER — RX RENEWAL (OUTPATIENT)
Age: 60
End: 2020-04-21

## 2020-05-13 ENCOUNTER — APPOINTMENT (OUTPATIENT)
Dept: OPHTHALMOLOGY | Facility: CLINIC | Age: 60
End: 2020-05-13

## 2020-05-13 ENCOUNTER — NON-APPOINTMENT (OUTPATIENT)
Age: 60
End: 2020-05-13

## 2020-05-13 ENCOUNTER — APPOINTMENT (OUTPATIENT)
Dept: OPHTHALMOLOGY | Facility: CLINIC | Age: 60
End: 2020-05-13
Payer: COMMERCIAL

## 2020-05-13 PROCEDURE — 92012 INTRM OPH EXAM EST PATIENT: CPT

## 2020-06-16 ENCOUNTER — NON-APPOINTMENT (OUTPATIENT)
Age: 60
End: 2020-06-16

## 2020-06-16 ENCOUNTER — APPOINTMENT (OUTPATIENT)
Dept: CARDIOLOGY | Facility: CLINIC | Age: 60
End: 2020-06-16
Payer: COMMERCIAL

## 2020-06-16 VITALS
SYSTOLIC BLOOD PRESSURE: 134 MMHG | WEIGHT: 265 LBS | BODY MASS INDEX: 35.12 KG/M2 | OXYGEN SATURATION: 100 % | DIASTOLIC BLOOD PRESSURE: 85 MMHG | HEIGHT: 73 IN | HEART RATE: 84 BPM

## 2020-06-16 PROCEDURE — 93000 ELECTROCARDIOGRAM COMPLETE: CPT

## 2020-06-16 PROCEDURE — 99214 OFFICE O/P EST MOD 30 MIN: CPT

## 2020-06-16 RX ORDER — CARVEDILOL 6.25 MG/1
6.25 TABLET, FILM COATED ORAL TWICE DAILY
Qty: 60 | Refills: 3 | Status: DISCONTINUED | COMMUNITY
Start: 2019-03-12 | End: 2020-06-16

## 2020-06-16 NOTE — PHYSICAL EXAM
[Normal Appearance] : normal appearance [General Appearance - In No Acute Distress] : no acute distress [Normal Conjunctiva] : the conjunctiva exhibited no abnormalities [Normal Oral Mucosa] : normal oral mucosa [Normal Jugular Venous V Waves Present] : normal jugular venous V waves present [Respiration, Rhythm And Depth] : normal respiratory rhythm and effort [Exaggerated Use Of Accessory Muscles For Inspiration] : no accessory muscle use [Auscultation Breath Sounds / Voice Sounds] : lungs were clear to auscultation bilaterally [Bowel Sounds] : normal bowel sounds [Abdomen Tenderness] : non-tender [Abdomen Soft] : soft [Abnormal Walk] : normal gait [Nail Clubbing] : no clubbing of the fingernails [Gait - Sufficient For Exercise Testing] : the gait was sufficient for exercise testing [Cyanosis, Localized] : no localized cyanosis [Petechial Hemorrhages (___cm)] : no petechial hemorrhages [Skin Color & Pigmentation] : normal skin color and pigmentation [] : no rash [No Venous Stasis] : no venous stasis [Oriented To Time, Place, And Person] : oriented to person, place, and time [Skin Lesions] : no skin lesions [Affect] : the affect was normal [Mood] : the mood was normal [No Anxiety] : not feeling anxious [Not Palpable] : not palpable [Premature Beats] : regular with premature beats [Normal Rate] : normal [Normal S1] : normal S1 [Normal S2] : normal S2 [No Gallop] : no gallop heard [No Murmur] : no murmurs heard [1+] : left 1+ [No Abnormalities] : the abdominal aorta was not enlarged and no bruit was heard [No Pitting Edema] : no pitting edema present [FreeTextEntry1] : No JVD [Left Carotid Bruit] : no bruit heard over the left carotid [Right Carotid Bruit] : no bruit heard over the right carotid [Bruit] : no bruit heard

## 2020-06-16 NOTE — HISTORY OF PRESENT ILLNESS
[FreeTextEntry1] : This is a 60 year old man with a history of obesity, diabetes, hypertension, and hyperlipidemia who presents to the office for a follow up visit.  He has a history of frequent PVCs, and had been on acebutolol for years.  I stopped it, and a Holter showed insignificant PVCs and no NSVT.  He reports no symptoms of chest pain, SEGURA, PND, orthopnea, LE swelling, dizziness, or syncope.  At times he gets very mild palpitations, but they are not bothersome at all.  He has not lost any significant weight since his last visit.  His BP is controlled.  He has no symptoms with exertion.  His A1c is controlled.  He is trying to eat better, but not doing a great job.  His degree of palpitations is stable.

## 2020-06-16 NOTE — CARDIOLOGY SUMMARY
[No Ischemia] : no Ischemia [___] : [unfilled] [None] : no pulmonary hypertension [Normal] : normal LA size [Mild] : mild mitral regurgitation

## 2020-06-16 NOTE — DISCUSSION/SUMMARY
[FreeTextEntry1] : This is a 60 year old man with a history of obesity, diabetes, hypertension, and hyperlipidemia who presents to the office for a follow up visit.  His pressure is controlled.  He will continue carvedilol 12.5 bid.  He will continue olmesartan/HCTZ, and he will continue it.  He will continue amlodipine 10 QD. He will continue a baby aspirin for primary prevention.  He will continue atorvastatin, and his LDL is at goal.     I will see him back in six months.  He knows to call the office with any issues.  We discussed the benefits of a healthy diet, regular exercise and physical activity, and weight loss in detail.\par

## 2020-06-18 ENCOUNTER — APPOINTMENT (OUTPATIENT)
Dept: INTERNAL MEDICINE | Facility: CLINIC | Age: 60
End: 2020-06-18
Payer: COMMERCIAL

## 2020-06-18 ENCOUNTER — EMERGENCY (EMERGENCY)
Facility: HOSPITAL | Age: 60
LOS: 1 days | Discharge: HOME | End: 2020-06-18
Attending: EMERGENCY MEDICINE | Admitting: EMERGENCY MEDICINE
Payer: COMMERCIAL

## 2020-06-18 VITALS
RESPIRATION RATE: 14 BRPM | OXYGEN SATURATION: 97 % | TEMPERATURE: 98 F | DIASTOLIC BLOOD PRESSURE: 86 MMHG | HEART RATE: 82 BPM | HEIGHT: 73 IN | SYSTOLIC BLOOD PRESSURE: 136 MMHG

## 2020-06-18 VITALS
HEIGHT: 73 IN | DIASTOLIC BLOOD PRESSURE: 89 MMHG | TEMPERATURE: 98 F | RESPIRATION RATE: 14 BRPM | OXYGEN SATURATION: 99 % | WEIGHT: 265 LBS | HEART RATE: 87 BPM | SYSTOLIC BLOOD PRESSURE: 157 MMHG

## 2020-06-18 VITALS
DIASTOLIC BLOOD PRESSURE: 87 MMHG | HEART RATE: 88 BPM | SYSTOLIC BLOOD PRESSURE: 145 MMHG | RESPIRATION RATE: 14 BRPM | TEMPERATURE: 98 F | OXYGEN SATURATION: 99 %

## 2020-06-18 DIAGNOSIS — R31.0 GROSS HEMATURIA: ICD-10-CM

## 2020-06-18 DIAGNOSIS — R39.15 URGENCY OF URINATION: ICD-10-CM

## 2020-06-18 LAB
ALBUMIN SERPL ELPH-MCNC: 4.1 G/DL — SIGNIFICANT CHANGE UP (ref 3.3–5)
ALP SERPL-CCNC: 70 U/L — SIGNIFICANT CHANGE UP (ref 30–120)
ALT FLD-CCNC: 45 U/L DA — SIGNIFICANT CHANGE UP (ref 10–60)
ANION GAP SERPL CALC-SCNC: 9 MMOL/L — SIGNIFICANT CHANGE UP (ref 5–17)
APPEARANCE UR: ABNORMAL
AST SERPL-CCNC: 28 U/L — SIGNIFICANT CHANGE UP (ref 10–40)
BACTERIA # UR AUTO: ABNORMAL
BASOPHILS # BLD AUTO: 0.02 K/UL — SIGNIFICANT CHANGE UP (ref 0–0.2)
BASOPHILS NFR BLD AUTO: 0.2 % — SIGNIFICANT CHANGE UP (ref 0–2)
BILIRUB SERPL-MCNC: 0.9 MG/DL — SIGNIFICANT CHANGE UP (ref 0.2–1.2)
BILIRUB UR-MCNC: NEGATIVE — SIGNIFICANT CHANGE UP
BUN SERPL-MCNC: 19 MG/DL — SIGNIFICANT CHANGE UP (ref 7–23)
CALCIUM SERPL-MCNC: 9.2 MG/DL — SIGNIFICANT CHANGE UP (ref 8.4–10.5)
CHLORIDE SERPL-SCNC: 103 MMOL/L — SIGNIFICANT CHANGE UP (ref 96–108)
CO2 SERPL-SCNC: 26 MMOL/L — SIGNIFICANT CHANGE UP (ref 22–31)
COLOR SPEC: YELLOW — SIGNIFICANT CHANGE UP
CREAT SERPL-MCNC: 0.92 MG/DL — SIGNIFICANT CHANGE UP (ref 0.5–1.3)
DIFF PNL FLD: ABNORMAL
EOSINOPHIL # BLD AUTO: 0.23 K/UL — SIGNIFICANT CHANGE UP (ref 0–0.5)
EOSINOPHIL NFR BLD AUTO: 2.5 % — SIGNIFICANT CHANGE UP (ref 0–6)
EPI CELLS # UR: ABNORMAL
GLUCOSE SERPL-MCNC: 122 MG/DL — HIGH (ref 70–99)
GLUCOSE UR QL: NEGATIVE MG/DL — SIGNIFICANT CHANGE UP
HCT VFR BLD CALC: 40.9 % — SIGNIFICANT CHANGE UP (ref 39–50)
HGB BLD-MCNC: 13.9 G/DL — SIGNIFICANT CHANGE UP (ref 13–17)
IMM GRANULOCYTES NFR BLD AUTO: 0.4 % — SIGNIFICANT CHANGE UP (ref 0–1.5)
KETONES UR-MCNC: NEGATIVE — SIGNIFICANT CHANGE UP
LEUKOCYTE ESTERASE UR-ACNC: ABNORMAL
LYMPHOCYTES # BLD AUTO: 2.15 K/UL — SIGNIFICANT CHANGE UP (ref 1–3.3)
LYMPHOCYTES # BLD AUTO: 23.8 % — SIGNIFICANT CHANGE UP (ref 13–44)
MCHC RBC-ENTMCNC: 29.1 PG — SIGNIFICANT CHANGE UP (ref 27–34)
MCHC RBC-ENTMCNC: 34 GM/DL — SIGNIFICANT CHANGE UP (ref 32–36)
MCV RBC AUTO: 85.6 FL — SIGNIFICANT CHANGE UP (ref 80–100)
MONOCYTES # BLD AUTO: 0.68 K/UL — SIGNIFICANT CHANGE UP (ref 0–0.9)
MONOCYTES NFR BLD AUTO: 7.5 % — SIGNIFICANT CHANGE UP (ref 2–14)
NEUTROPHILS # BLD AUTO: 5.93 K/UL — SIGNIFICANT CHANGE UP (ref 1.8–7.4)
NEUTROPHILS NFR BLD AUTO: 65.6 % — SIGNIFICANT CHANGE UP (ref 43–77)
NITRITE UR-MCNC: NEGATIVE — SIGNIFICANT CHANGE UP
NRBC # BLD: 0 /100 WBCS — SIGNIFICANT CHANGE UP (ref 0–0)
PH UR: 5 — SIGNIFICANT CHANGE UP (ref 5–8)
PLATELET # BLD AUTO: 280 K/UL — SIGNIFICANT CHANGE UP (ref 150–400)
POTASSIUM SERPL-MCNC: 3.9 MMOL/L — SIGNIFICANT CHANGE UP (ref 3.5–5.3)
POTASSIUM SERPL-SCNC: 3.9 MMOL/L — SIGNIFICANT CHANGE UP (ref 3.5–5.3)
PROT SERPL-MCNC: 8.1 G/DL — SIGNIFICANT CHANGE UP (ref 6–8.3)
PROT UR-MCNC: 30 MG/DL
RBC # BLD: 4.78 M/UL — SIGNIFICANT CHANGE UP (ref 4.2–5.8)
RBC # FLD: 13 % — SIGNIFICANT CHANGE UP (ref 10.3–14.5)
RBC CASTS # UR COMP ASSIST: ABNORMAL /HPF (ref 0–4)
SODIUM SERPL-SCNC: 138 MMOL/L — SIGNIFICANT CHANGE UP (ref 135–145)
SP GR SPEC: 1.01 — SIGNIFICANT CHANGE UP (ref 1.01–1.02)
UROBILINOGEN FLD QL: NEGATIVE MG/DL — SIGNIFICANT CHANGE UP
WBC # BLD: 9.05 K/UL — SIGNIFICANT CHANGE UP (ref 3.8–10.5)
WBC # FLD AUTO: 9.05 K/UL — SIGNIFICANT CHANGE UP (ref 3.8–10.5)
WBC UR QL: SIGNIFICANT CHANGE UP

## 2020-06-18 PROCEDURE — 85027 COMPLETE CBC AUTOMATED: CPT

## 2020-06-18 PROCEDURE — 80053 COMPREHEN METABOLIC PANEL: CPT

## 2020-06-18 PROCEDURE — 81001 URINALYSIS AUTO W/SCOPE: CPT

## 2020-06-18 PROCEDURE — 74176 CT ABD & PELVIS W/O CONTRAST: CPT

## 2020-06-18 PROCEDURE — 99214 OFFICE O/P EST MOD 30 MIN: CPT

## 2020-06-18 PROCEDURE — 99284 EMERGENCY DEPT VISIT MOD MDM: CPT

## 2020-06-18 PROCEDURE — 87086 URINE CULTURE/COLONY COUNT: CPT

## 2020-06-18 PROCEDURE — 36415 COLL VENOUS BLD VENIPUNCTURE: CPT

## 2020-06-18 PROCEDURE — 99284 EMERGENCY DEPT VISIT MOD MDM: CPT | Mod: 25

## 2020-06-18 PROCEDURE — 74176 CT ABD & PELVIS W/O CONTRAST: CPT | Mod: 26

## 2020-06-18 RX ORDER — METFORMIN HYDROCHLORIDE 850 MG/1
1 TABLET ORAL
Qty: 0 | Refills: 0 | DISCHARGE

## 2020-06-18 RX ORDER — SODIUM CHLORIDE 9 MG/ML
1000 INJECTION INTRAMUSCULAR; INTRAVENOUS; SUBCUTANEOUS ONCE
Refills: 0 | Status: COMPLETED | OUTPATIENT
Start: 2020-06-18 | End: 2020-06-18

## 2020-06-18 RX ORDER — AMLODIPINE BESYLATE 2.5 MG/1
1 TABLET ORAL
Qty: 0 | Refills: 0 | DISCHARGE

## 2020-06-18 RX ORDER — CARVEDILOL PHOSPHATE 80 MG/1
0 CAPSULE, EXTENDED RELEASE ORAL
Qty: 0 | Refills: 0 | DISCHARGE

## 2020-06-18 RX ORDER — OLMESARTAN MEDOXOMIL 5 MG/1
0 TABLET, FILM COATED ORAL
Qty: 0 | Refills: 0 | DISCHARGE

## 2020-06-18 RX ORDER — ATORVASTATIN CALCIUM 80 MG/1
1 TABLET, FILM COATED ORAL
Qty: 0 | Refills: 0 | DISCHARGE

## 2020-06-18 RX ADMIN — SODIUM CHLORIDE 1000 MILLILITER(S): 9 INJECTION INTRAMUSCULAR; INTRAVENOUS; SUBCUTANEOUS at 16:52

## 2020-06-18 RX ADMIN — SODIUM CHLORIDE 1000 MILLILITER(S): 9 INJECTION INTRAMUSCULAR; INTRAVENOUS; SUBCUTANEOUS at 17:12

## 2020-06-18 NOTE — ED PROVIDER NOTE - CARE PLAN
Principal Discharge DX:	Hydronephrosis  Goal:	mild Left  Secondary Diagnosis:	Flank pain  Secondary Diagnosis:	Hematuria

## 2020-06-18 NOTE — ED PROVIDER NOTE - CHPI ED SYMPTOMS NEG
no burning urination/no abdominal distension/no dysuria/no vomiting/no nausea/no chills/no fever/no diarrhea

## 2020-06-18 NOTE — ED PROVIDER NOTE - CARE PROVIDER_API CALL
Misael Hughes  UROLOGY  [address] [place] [address]  Phone: [phone]  Fax: [phone]  Follow Up Time:

## 2020-06-18 NOTE — ED PROVIDER NOTE - CLINICAL SUMMARY MEDICAL DECISION MAKING FREE TEXT BOX
59 yo M with hematuria and mild L flank pain since this am, no fever/n/v/d/dysuria, no hx of kidney stones, sent by pmd to r/o kidney stones, +mild L flank ttp, abdomen NT, afebrile, VSS, will get labs, ua, ivf, refused pain meds in ED, will get ct renal stone hunt, reassess

## 2020-06-18 NOTE — ED PROVIDER NOTE - NSFOLLOWUPINSTRUCTIONS_ED_ALL_ED_FT
Follow up with urologist in 2-3 days with appointment for re-evaluation, ongoing care and treatment. Rest, drink plenty of fluids, take tylenol 650mg every 6 hours as needed for pain. If having worsening of symptoms, fever, vomiting or other related symptoms, RETURN TO THE ER IMMEDIATELY.

## 2020-06-18 NOTE — HISTORY OF PRESENT ILLNESS
[FreeTextEntry8] : Pt has urinated three times today with gross hematuria. He has worsening left flank pain radiating to the groin \par He has constant urinary urgency

## 2020-06-18 NOTE — ED PROVIDER NOTE - PATIENT PORTAL LINK FT
You can access the FollowMyHealth Patient Portal offered by [facility] by registering at the following website: [email or web address]. By joining [facility]’s FollowMyHealth portal, you will also be able to view your health information using other applications (apps) compatible with our system.

## 2020-06-18 NOTE — ED PROVIDER NOTE - PROGRESS NOTE DETAILS
Rahel SUÁREZ for Dr. Bennett: 61 y/o male with a PMHx of DM, HTN, HLD, presents to the ED c/o L flank pain and hematuria since this morning. Denies fever, N/V, dysuria, or abdominal pain. No prior hx of kidney stone. Seen by Dr. Borges today and sent to ER for further evaluation.   Physical exam: VSS, afebrile, NAD, Heart and lungs normal. Abd soft, non-tender, no rebound or CVA tenderness.  Plan; L flank pain r/o kidney stone, plan; CT, UA, labs, IV fluids. Reevaluated patient at bedside.  Patient feeling much improved.  Discussed the results of all diagnostic testing in ED and copies of all reports given. Will dc home and fu urology.  An opportunity to ask questions was given.  Discussed the importance of prompt, close medical follow-up.  Patient will return with any changes, concerns or persistent / worsening symptoms.  Understanding of all instructions verbalized.

## 2020-06-18 NOTE — PHYSICAL EXAM
[No Acute Distress] : no acute distress [Well Developed] : well developed [Well Nourished] : well nourished [Normal Sclera/Conjunctiva] : normal sclera/conjunctiva [Well-Appearing] : well-appearing [PERRL] : pupils equal round and reactive to light [EOMI] : extraocular movements intact [Normal Outer Ear/Nose] : the outer ears and nose were normal in appearance [No JVD] : no jugular venous distention [Normal Oropharynx] : the oropharynx was normal [No Lymphadenopathy] : no lymphadenopathy [Supple] : supple [No Respiratory Distress] : no respiratory distress  [Thyroid Normal, No Nodules] : the thyroid was normal and there were no nodules present [No Accessory Muscle Use] : no accessory muscle use [Normal Rate] : normal rate  [Clear to Auscultation] : lungs were clear to auscultation bilaterally [Normal S1, S2] : normal S1 and S2 [Regular Rhythm] : with a regular rhythm [No Murmur] : no murmur heard [No Carotid Bruits] : no carotid bruits [No Varicosities] : no varicosities [Pedal Pulses Present] : the pedal pulses are present [No Abdominal Bruit] : a ~M bruit was not heard ~T in the abdomen [No Extremity Clubbing/Cyanosis] : no extremity clubbing/cyanosis [No Edema] : there was no peripheral edema [No Palpable Aorta] : no palpable aorta [Soft] : abdomen soft [Non Tender] : non-tender [Non-distended] : non-distended [No HSM] : no HSM [No Masses] : no abdominal mass palpated [Normal Bowel Sounds] : normal bowel sounds [Normal Posterior Cervical Nodes] : no posterior cervical lymphadenopathy [Normal Anterior Cervical Nodes] : no anterior cervical lymphadenopathy [No Spinal Tenderness] : no spinal tenderness [No CVA Tenderness] : no CVA  tenderness [No Joint Swelling] : no joint swelling [Grossly Normal Strength/Tone] : grossly normal strength/tone [No Rash] : no rash [Coordination Grossly Intact] : coordination grossly intact [No Focal Deficits] : no focal deficits [Normal Gait] : normal gait [Deep Tendon Reflexes (DTR)] : deep tendon reflexes were 2+ and symmetric [Normal Affect] : the affect was normal [Normal Insight/Judgement] : insight and judgment were intact

## 2020-06-18 NOTE — ED PROVIDER NOTE - OBJECTIVE STATEMENT
61 y/o M with hx of HTN, DM, HLD presents with c/o hematuria and L flank pain x today. Pt states that he started having hematuria this morning and noticed gradually started having mild L flank discomfort radiating to his L groin. States that pain is mild and did not take any pain meds. Denies fever, chills, n/v/d, trauma, numbness, tingling, hx of kidney stones, dysuria, testicular pain or other symptoms. States that he was seen by his PMD, Dr. Borges in office today and sent to ED to r/o kidney stones.

## 2020-06-19 LAB
CULTURE RESULTS: NO GROWTH — SIGNIFICANT CHANGE UP
SPECIMEN SOURCE: SIGNIFICANT CHANGE UP

## 2020-06-25 ENCOUNTER — APPOINTMENT (OUTPATIENT)
Dept: ENDOCRINOLOGY | Facility: CLINIC | Age: 60
End: 2020-06-25
Payer: COMMERCIAL

## 2020-06-25 VITALS
HEART RATE: 95 BPM | WEIGHT: 265 LBS | DIASTOLIC BLOOD PRESSURE: 84 MMHG | SYSTOLIC BLOOD PRESSURE: 142 MMHG | BODY MASS INDEX: 34.96 KG/M2 | RESPIRATION RATE: 15 BRPM | OXYGEN SATURATION: 95 %

## 2020-06-25 PROCEDURE — 83036 HEMOGLOBIN GLYCOSYLATED A1C: CPT | Mod: QW

## 2020-06-25 PROCEDURE — 99214 OFFICE O/P EST MOD 30 MIN: CPT | Mod: 25

## 2020-06-25 PROCEDURE — 82962 GLUCOSE BLOOD TEST: CPT

## 2020-06-25 PROCEDURE — 36415 COLL VENOUS BLD VENIPUNCTURE: CPT

## 2020-06-25 NOTE — ASSESSMENT
[FreeTextEntry1] : 61 yo M with PMH DM2, HTN, HLD \par \par 1. DM2  - continue regimen as above.  Continue ARB and statin. consistent carb diet  advised.  \par \par 2. HTN -  continue olmesartan/HCTZ,,amlodipine, and coreg\par \par 3. HLD - continue atorvastatin.\par \par RV 3 months

## 2020-06-25 NOTE — HISTORY OF PRESENT ILLNESS
[FreeTextEntry1] : 59 yo M with PMH DM2, HTN, HLD \par \par Diagnosed with DM2 when 44 yrs old.\par Microvascular complications: + nephropathy\par After initial visit to endocrinology tradjenta was discontinued and he was started on trulicity.\par He was changed to ozempic 1 mg in lieu of trulicity 1.5 mg q week,  also continues on glipizide ER 10 mg 2 tablets qd with metformin 1000 mg BID\par now with increased exercise\par also refer to scanned glucose log.\par B: bread + glucerna\par L: TV dinner + diet soda\par D: pork chops + 1/4 plate rice + diet soda\par snacks: frozen yogurt bar\par last ophthalmologist visit: 3 weeks ago\par last podiatrist visit: 1 year ago\par \par \par

## 2020-07-02 ENCOUNTER — RX RENEWAL (OUTPATIENT)
Age: 60
End: 2020-07-02

## 2020-07-02 ENCOUNTER — NON-APPOINTMENT (OUTPATIENT)
Age: 60
End: 2020-07-02

## 2020-07-02 LAB
24R-OH-CALCIDIOL SERPL-MCNC: 30.2 PG/ML
25(OH)D3 SERPL-MCNC: 30 NG/ML
ALBUMIN SERPL ELPH-MCNC: 5.1 G/DL
ALP BLD-CCNC: 72 U/L
ALT SERPL-CCNC: 35 U/L
ANION GAP SERPL CALC-SCNC: 16 MMOL/L
AST SERPL-CCNC: 25 U/L
BILIRUB SERPL-MCNC: 0.9 MG/DL
BUN SERPL-MCNC: 27 MG/DL
CALCIUM SERPL-MCNC: 10.1 MG/DL
CALCIUM SERPL-MCNC: 10.3 MG/DL
CHLORIDE SERPL-SCNC: 102 MMOL/L
CHOLEST SERPL-MCNC: 130 MG/DL
CHOLEST/HDLC SERPL: 3.2 RATIO
CK SERPL-CCNC: 292 U/L
CO2 SERPL-SCNC: 24 MMOL/L
CREAT SERPL-MCNC: 0.85 MG/DL
CREAT SPEC-SCNC: 162 MG/DL
ESTIMATED AVERAGE GLUCOSE: 117 MG/DL
GLUCOSE BLDC GLUCOMTR-MCNC: 142
GLUCOSE SERPL-MCNC: 175 MG/DL
HBA1C MFR BLD HPLC: 5.7
HBA1C MFR BLD HPLC: 5.7 %
HDLC SERPL-MCNC: 41 MG/DL
LDLC SERPL CALC-MCNC: 48 MG/DL
MAGNESIUM SERPL-MCNC: 1.7 MG/DL
MICROALBUMIN 24H UR DL<=1MG/L-MCNC: 2.8 MG/DL
MICROALBUMIN/CREAT 24H UR-RTO: 17 MG/G
PARATHYROID HORMONE INTACT: 24 PG/ML
PHOSPHATE SERPL-MCNC: 2.7 MG/DL
POTASSIUM SERPL-SCNC: 4.4 MMOL/L
PROT SERPL-MCNC: 7.4 G/DL
SODIUM SERPL-SCNC: 142 MMOL/L
TRIGL SERPL-MCNC: 207 MG/DL

## 2020-08-20 ENCOUNTER — RX RENEWAL (OUTPATIENT)
Age: 60
End: 2020-08-20

## 2020-08-28 ENCOUNTER — NON-APPOINTMENT (OUTPATIENT)
Age: 60
End: 2020-08-28

## 2020-08-28 ENCOUNTER — APPOINTMENT (OUTPATIENT)
Dept: OPHTHALMOLOGY | Facility: CLINIC | Age: 60
End: 2020-08-28
Payer: COMMERCIAL

## 2020-08-28 PROBLEM — E11.9 TYPE 2 DIABETES MELLITUS WITHOUT COMPLICATIONS: Chronic | Status: ACTIVE | Noted: 2020-06-21

## 2020-08-28 PROBLEM — I10 ESSENTIAL (PRIMARY) HYPERTENSION: Chronic | Status: ACTIVE | Noted: 2020-06-21

## 2020-08-28 PROBLEM — E78.5 HYPERLIPIDEMIA, UNSPECIFIED: Chronic | Status: ACTIVE | Noted: 2020-06-21

## 2020-08-28 PROCEDURE — 92133 CPTRZD OPH DX IMG PST SGM ON: CPT

## 2020-08-28 PROCEDURE — 92014 COMPRE OPH EXAM EST PT 1/>: CPT

## 2020-10-22 NOTE — ASSESSMENT
[FreeTextEntry1] : EKG NSR Xerosis Normal Treatment: I recommended application of Cetaphil or CeraVe numerous times a day going to bed to all dry areas.

## 2020-10-27 ENCOUNTER — APPOINTMENT (OUTPATIENT)
Dept: INTERNAL MEDICINE | Facility: CLINIC | Age: 60
End: 2020-10-27
Payer: COMMERCIAL

## 2020-10-27 PROCEDURE — G0008: CPT

## 2020-10-27 PROCEDURE — 90686 IIV4 VACC NO PRSV 0.5 ML IM: CPT

## 2020-10-27 PROCEDURE — 99072 ADDL SUPL MATRL&STAF TM PHE: CPT

## 2020-11-06 ENCOUNTER — APPOINTMENT (OUTPATIENT)
Dept: ENDOCRINOLOGY | Facility: CLINIC | Age: 60
End: 2020-11-06
Payer: COMMERCIAL

## 2020-11-06 PROCEDURE — 99213 OFFICE O/P EST LOW 20 MIN: CPT | Mod: 25

## 2020-11-06 PROCEDURE — 36415 COLL VENOUS BLD VENIPUNCTURE: CPT

## 2020-11-06 PROCEDURE — 99072 ADDL SUPL MATRL&STAF TM PHE: CPT

## 2020-11-06 PROCEDURE — 82962 GLUCOSE BLOOD TEST: CPT

## 2020-11-06 NOTE — ASSESSMENT
[FreeTextEntry1] : 61 yo M with PMH DM2, HTN, HLD \par \par 1. DM2  - continue regimen as above.  Continue ARB and statin. consistent carb diet  advised.  \par \par 2. HTN -  continue olmesartan,CTZ,,amlodipine, and coreg\par \par 3. HLD - continue atorvastatin.\par \par RV 3 months

## 2020-11-06 NOTE — HISTORY OF PRESENT ILLNESS
[FreeTextEntry1] : 59 yo M with PMH DM2, HTN, HLD \par \par Diagnosed with DM2 when 44 yrs old.\par Microvascular complications: + nephropathy\par After initial visit to endocrinology tradjenta was discontinued and he was started on trulicity.\par He was changed to ozempic 1 mg in lieu of trulicity 1.5 mg q week,  also continues on glipizide ER 10 mg 2 tablets qd with metformin 1000 mg BID\par now with increased exercise\par also refer to scanned glucose log.\par B: bread + glucerna\par L: TV dinner + diet soda\par D: pork chops + 1/4 plate rice + diet soda\par snacks: pepperoni, cheese\par last ophthalmologist visit:  2 months ago glaucoma\par last podiatrist visit: 1 year ago\par with intense bikes  rides may become hypoglycemic (45 mins) \par \par \par

## 2020-11-13 LAB
25(OH)D3 SERPL-MCNC: 32.9 NG/ML
ALBUMIN SERPL ELPH-MCNC: 4.9 G/DL
ALP BLD-CCNC: 79 U/L
ALT SERPL-CCNC: 38 U/L
ANION GAP SERPL CALC-SCNC: 14 MMOL/L
AST SERPL-CCNC: 26 U/L
BILIRUB SERPL-MCNC: 0.8 MG/DL
BUN SERPL-MCNC: 17 MG/DL
CALCIUM SERPL-MCNC: 9.6 MG/DL
CHLORIDE SERPL-SCNC: 102 MMOL/L
CO2 SERPL-SCNC: 25 MMOL/L
CREAT SERPL-MCNC: 0.77 MG/DL
ESTIMATED AVERAGE GLUCOSE: 126 MG/DL
GLUCOSE SERPL-MCNC: 171 MG/DL
HBA1C MFR BLD HPLC: 6 %
POTASSIUM SERPL-SCNC: 4.2 MMOL/L
PROT SERPL-MCNC: 7.3 G/DL
SODIUM SERPL-SCNC: 141 MMOL/L
T4 FREE SERPL-MCNC: 1.4 NG/DL
THYROGLOB AB SERPL-ACNC: <20 IU/ML
THYROPEROXIDASE AB SERPL IA-ACNC: 15.7 IU/ML
TSH SERPL-ACNC: 0.76 UIU/ML

## 2020-11-23 ENCOUNTER — APPOINTMENT (OUTPATIENT)
Dept: OPHTHALMOLOGY | Facility: CLINIC | Age: 60
End: 2020-11-23

## 2020-12-23 ENCOUNTER — RX RENEWAL (OUTPATIENT)
Age: 60
End: 2020-12-23

## 2021-01-14 ENCOUNTER — APPOINTMENT (OUTPATIENT)
Dept: CARDIOLOGY | Facility: CLINIC | Age: 61
End: 2021-01-14
Payer: COMMERCIAL

## 2021-01-14 ENCOUNTER — NON-APPOINTMENT (OUTPATIENT)
Age: 61
End: 2021-01-14

## 2021-01-14 VITALS
WEIGHT: 262 LBS | HEIGHT: 74 IN | BODY MASS INDEX: 33.62 KG/M2 | SYSTOLIC BLOOD PRESSURE: 132 MMHG | OXYGEN SATURATION: 97 % | DIASTOLIC BLOOD PRESSURE: 75 MMHG | HEART RATE: 92 BPM

## 2021-01-14 PROCEDURE — 99214 OFFICE O/P EST MOD 30 MIN: CPT

## 2021-01-14 PROCEDURE — 93000 ELECTROCARDIOGRAM COMPLETE: CPT

## 2021-01-14 PROCEDURE — 99072 ADDL SUPL MATRL&STAF TM PHE: CPT

## 2021-01-14 NOTE — HISTORY OF PRESENT ILLNESS
[FreeTextEntry1] : This is a 61 year old man with a history of obesity, diabetes, hypertension, and hyperlipidemia who presents to the office for a follow up visit.  He has a history of frequent PVCs, and had been on acebutolol for years.  I stopped it, and a Holter showed insignificant PVCs and no NSVT.  He reports no symptoms of chest pain, SEGURA, PND, orthopnea, LE swelling, dizziness, or syncope.  At times he gets very mild palpitations, but they are not bothersome at all.  He has not lost any significant weight since his last visit.  His BP is controlled.  He has no symptoms with exertion.  His A1c is controlled.  He is trying to eat better, but not doing a great job.  His degree of palpitations is stable.

## 2021-01-14 NOTE — CARDIOLOGY SUMMARY
[___] : [unfilled] [No Ischemia] : no Ischemia [None] : no pulmonary hypertension [Normal] : normal LA size [Mild] : mild mitral regurgitation

## 2021-01-14 NOTE — PHYSICAL EXAM
[Normal Appearance] : normal appearance [General Appearance - In No Acute Distress] : no acute distress [Normal Conjunctiva] : the conjunctiva exhibited no abnormalities [Normal Oral Mucosa] : normal oral mucosa [Normal Jugular Venous V Waves Present] : normal jugular venous V waves present [FreeTextEntry1] : No JVD [Respiration, Rhythm And Depth] : normal respiratory rhythm and effort [Exaggerated Use Of Accessory Muscles For Inspiration] : no accessory muscle use [Auscultation Breath Sounds / Voice Sounds] : lungs were clear to auscultation bilaterally [Bowel Sounds] : normal bowel sounds [Abdomen Soft] : soft [Abdomen Tenderness] : non-tender [Abnormal Walk] : normal gait [Gait - Sufficient For Exercise Testing] : the gait was sufficient for exercise testing [Nail Clubbing] : no clubbing of the fingernails [Cyanosis, Localized] : no localized cyanosis [Petechial Hemorrhages (___cm)] : no petechial hemorrhages [Skin Color & Pigmentation] : normal skin color and pigmentation [] : no rash [No Venous Stasis] : no venous stasis [Skin Lesions] : no skin lesions [Oriented To Time, Place, And Person] : oriented to person, place, and time [Affect] : the affect was normal [Mood] : the mood was normal [No Anxiety] : not feeling anxious [Not Palpable] : not palpable [Normal Rate] : normal [Premature Beats] : regular with premature beats [Normal S1] : normal S1 [Normal S2] : normal S2 [No Gallop] : no gallop heard [No Murmur] : no murmurs heard [Right Carotid Bruit] : no bruit heard over the right carotid [Left Carotid Bruit] : no bruit heard over the left carotid [1+] : left 1+ [No Abnormalities] : the abdominal aorta was not enlarged and no bruit was heard [Bruit] : no bruit heard [No Pitting Edema] : no pitting edema present

## 2021-01-14 NOTE — DISCUSSION/SUMMARY
[FreeTextEntry1] : This is a 61 year old man with a history of obesity, diabetes, hypertension, and hyperlipidemia who presents to the office for a follow up visit.  His pressure is controlled.  He will continue carvedilol 12.5 bid.  He will continue olmesartan/HCTZ, and he will continue it.  He will continue amlodipine 10 QD. He will continue a baby aspirin for primary prevention.  He will continue atorvastatin, and his LDL is at goal.     I will see him back in six months.  He knows to call the office with any issues.  We discussed the benefits of a healthy diet, regular exercise and physical activity, and weight loss in detail.\par

## 2021-01-21 ENCOUNTER — NON-APPOINTMENT (OUTPATIENT)
Age: 61
End: 2021-01-21

## 2021-03-02 ENCOUNTER — RX RENEWAL (OUTPATIENT)
Age: 61
End: 2021-03-02

## 2021-03-31 ENCOUNTER — RX RENEWAL (OUTPATIENT)
Age: 61
End: 2021-03-31

## 2021-04-02 ENCOUNTER — RX RENEWAL (OUTPATIENT)
Age: 61
End: 2021-04-02

## 2021-04-08 ENCOUNTER — APPOINTMENT (OUTPATIENT)
Dept: INTERNAL MEDICINE | Facility: CLINIC | Age: 61
End: 2021-04-08
Payer: COMMERCIAL

## 2021-04-08 ENCOUNTER — LABORATORY RESULT (OUTPATIENT)
Age: 61
End: 2021-04-08

## 2021-04-08 VITALS
WEIGHT: 260 LBS | RESPIRATION RATE: 14 BRPM | DIASTOLIC BLOOD PRESSURE: 70 MMHG | SYSTOLIC BLOOD PRESSURE: 124 MMHG | TEMPERATURE: 97.9 F | BODY MASS INDEX: 33.38 KG/M2 | OXYGEN SATURATION: 98 % | HEART RATE: 79 BPM

## 2021-04-08 DIAGNOSIS — H40.9 UNSPECIFIED GLAUCOMA: ICD-10-CM

## 2021-04-08 PROCEDURE — 99072 ADDL SUPL MATRL&STAF TM PHE: CPT

## 2021-04-08 PROCEDURE — 99214 OFFICE O/P EST MOD 30 MIN: CPT

## 2021-04-08 RX ORDER — LATANOPROST/PF 0.005 %
0.01 DROPS OPHTHALMIC (EYE)
Refills: 0 | Status: ACTIVE | COMMUNITY

## 2021-04-08 RX ORDER — OLMESARTAN MEDOXOMIL 40 MG/1
40 TABLET, FILM COATED ORAL
Qty: 90 | Refills: 3 | Status: DISCONTINUED | COMMUNITY
Start: 2020-04-06 | End: 2021-04-08

## 2021-04-08 NOTE — PLAN
[FreeTextEntry1] : Recommend weight loss, improved diet/exercise\par Continue present medications for DM, HTN, hyperlipidemia, glaucoma\par check fasting labs

## 2021-04-11 LAB
ALBUMIN SERPL ELPH-MCNC: 4.9 G/DL
ALP BLD-CCNC: 80 U/L
ALT SERPL-CCNC: 30 U/L
ANION GAP SERPL CALC-SCNC: 14 MMOL/L
APPEARANCE: CLEAR
AST SERPL-CCNC: 22 U/L
BASOPHILS # BLD AUTO: 0.03 K/UL
BASOPHILS NFR BLD AUTO: 0.4 %
BILIRUB SERPL-MCNC: 0.9 MG/DL
BILIRUBIN URINE: NEGATIVE
BLOOD URINE: NEGATIVE
BUN SERPL-MCNC: 20 MG/DL
CALCIUM SERPL-MCNC: 10 MG/DL
CHLORIDE SERPL-SCNC: 100 MMOL/L
CHOLEST SERPL-MCNC: 149 MG/DL
CO2 SERPL-SCNC: 27 MMOL/L
COLOR: YELLOW
CREAT SERPL-MCNC: 0.92 MG/DL
EOSINOPHIL # BLD AUTO: 0.21 K/UL
EOSINOPHIL NFR BLD AUTO: 2.5 %
ESTIMATED AVERAGE GLUCOSE: 128 MG/DL
FOLATE SERPL-MCNC: 11 NG/ML
GLUCOSE QUALITATIVE U: NEGATIVE
GLUCOSE SERPL-MCNC: 150 MG/DL
HBA1C MFR BLD HPLC: 6.1 %
HCT VFR BLD CALC: 42.8 %
HDLC SERPL-MCNC: 41 MG/DL
HGB BLD-MCNC: 14.2 G/DL
IMM GRANULOCYTES NFR BLD AUTO: 0.1 %
KETONES URINE: NEGATIVE
LDLC SERPL CALC-MCNC: 64 MG/DL
LEUKOCYTE ESTERASE URINE: NEGATIVE
LYMPHOCYTES # BLD AUTO: 2.22 K/UL
LYMPHOCYTES NFR BLD AUTO: 26.1 %
MAN DIFF?: NORMAL
MCHC RBC-ENTMCNC: 28.8 PG
MCHC RBC-ENTMCNC: 33.2 GM/DL
MCV RBC AUTO: 86.8 FL
MONOCYTES # BLD AUTO: 0.63 K/UL
MONOCYTES NFR BLD AUTO: 7.4 %
NEUTROPHILS # BLD AUTO: 5.41 K/UL
NEUTROPHILS NFR BLD AUTO: 63.5 %
NITRITE URINE: NEGATIVE
NONHDLC SERPL-MCNC: 108 MG/DL
PH URINE: 5.5
PLATELET # BLD AUTO: 303 K/UL
POTASSIUM SERPL-SCNC: 4.2 MMOL/L
PROT SERPL-MCNC: 7.5 G/DL
PROTEIN URINE: ABNORMAL
PSA SERPL-MCNC: 0.43 NG/ML
RBC # BLD: 4.93 M/UL
RBC # FLD: 13.1 %
SODIUM SERPL-SCNC: 141 MMOL/L
SPECIFIC GRAVITY URINE: 1.03
TRIGL SERPL-MCNC: 220 MG/DL
TSH SERPL-ACNC: 0.87 UIU/ML
UROBILINOGEN URINE: NORMAL
VIT B12 SERPL-MCNC: 535 PG/ML
WBC # FLD AUTO: 8.51 K/UL

## 2021-04-29 ENCOUNTER — NON-APPOINTMENT (OUTPATIENT)
Age: 61
End: 2021-04-29

## 2021-04-29 ENCOUNTER — APPOINTMENT (OUTPATIENT)
Dept: OPHTHALMOLOGY | Facility: CLINIC | Age: 61
End: 2021-04-29
Payer: COMMERCIAL

## 2021-04-29 PROCEDURE — 92014 COMPRE OPH EXAM EST PT 1/>: CPT

## 2021-04-29 PROCEDURE — 92133 CPTRZD OPH DX IMG PST SGM ON: CPT

## 2021-04-29 PROCEDURE — 92083 EXTENDED VISUAL FIELD XM: CPT

## 2021-04-29 PROCEDURE — 99072 ADDL SUPL MATRL&STAF TM PHE: CPT

## 2021-04-30 ENCOUNTER — RX RENEWAL (OUTPATIENT)
Age: 61
End: 2021-04-30

## 2021-06-28 ENCOUNTER — RX RENEWAL (OUTPATIENT)
Age: 61
End: 2021-06-28

## 2021-07-14 ENCOUNTER — NON-APPOINTMENT (OUTPATIENT)
Age: 61
End: 2021-07-14

## 2021-07-14 ENCOUNTER — APPOINTMENT (OUTPATIENT)
Dept: CARDIOLOGY | Facility: CLINIC | Age: 61
End: 2021-07-14
Payer: MEDICAID

## 2021-07-14 VITALS
WEIGHT: 265 LBS | HEIGHT: 74 IN | DIASTOLIC BLOOD PRESSURE: 89 MMHG | OXYGEN SATURATION: 98 % | SYSTOLIC BLOOD PRESSURE: 144 MMHG | BODY MASS INDEX: 34.01 KG/M2 | HEART RATE: 76 BPM

## 2021-07-14 VITALS — SYSTOLIC BLOOD PRESSURE: 128 MMHG | DIASTOLIC BLOOD PRESSURE: 78 MMHG

## 2021-07-14 DIAGNOSIS — R42 DIZZINESS AND GIDDINESS: ICD-10-CM

## 2021-07-14 PROCEDURE — 93000 ELECTROCARDIOGRAM COMPLETE: CPT

## 2021-07-14 PROCEDURE — 99214 OFFICE O/P EST MOD 30 MIN: CPT

## 2021-07-14 NOTE — DISCUSSION/SUMMARY
[FreeTextEntry1] : This is a 61 year old man with a history of obesity, diabetes, hypertension, and hyperlipidemia who presents to the office for a follow up visit.  His pressure is controlled.  He will continue carvedilol 12.5 bid.  He will continue olmesartan/HCTZ, and he will continue it.  He will continue amlodipine 10 QD. He will continue a baby aspirin for primary prevention.  He will continue atorvastatin, and his LDL is at goal.     I will see him back in six months.  He knows to call the office with any issues.  We discussed the benefits of a healthy diet, regular exercise and physical activity, and weight loss in detail.  He is mildly dizzy when changing positions.  He will go for carotid Doppler and repeat echocardiography.

## 2021-07-14 NOTE — CARDIOLOGY SUMMARY
[No Ischemia] : no Ischemia [___] : [unfilled] [None] : no pulmonary hypertension [Normal] : normal LA size [Mild] : mild mitral regurgitation [de-identified] : 7/14/2021:  NAKITA Fernandez

## 2021-07-14 NOTE — HISTORY OF PRESENT ILLNESS
11 Hour(s) 34 Minute(s)
[FreeTextEntry1] : This is a 61 year old man with a history of obesity, diabetes, hypertension, and hyperlipidemia who presents to the office for a follow up visit.  He has a history of frequent PVCs, and had been on acebutolol for years.  I stopped it, and a Holter showed insignificant PVCs and no NSVT.  He reports no symptoms of chest pain, SEGURA, PND, orthopnea, LE swelling, dizziness, or syncope.  At times he gets very mild palpitations, but they are not bothersome at all.  He has not lost any significant weight since his last visit.  His BP is controlled.  He has no symptoms with exertion.  His A1c is controlled.  He is trying to eat better, but not doing a great job.  His degree of palpitations is stable.

## 2021-07-29 ENCOUNTER — APPOINTMENT (OUTPATIENT)
Dept: CARDIOLOGY | Facility: CLINIC | Age: 61
End: 2021-07-29
Payer: MEDICAID

## 2021-07-29 PROCEDURE — 93306 TTE W/DOPPLER COMPLETE: CPT

## 2021-07-29 PROCEDURE — 93880 EXTRACRANIAL BILAT STUDY: CPT

## 2021-08-18 ENCOUNTER — RX RENEWAL (OUTPATIENT)
Age: 61
End: 2021-08-18

## 2021-09-16 ENCOUNTER — APPOINTMENT (OUTPATIENT)
Dept: OPHTHALMOLOGY | Facility: CLINIC | Age: 61
End: 2021-09-16
Payer: COMMERCIAL

## 2021-09-16 ENCOUNTER — NON-APPOINTMENT (OUTPATIENT)
Age: 61
End: 2021-09-16

## 2021-09-16 PROCEDURE — 92083 EXTENDED VISUAL FIELD XM: CPT

## 2021-09-16 PROCEDURE — 92012 INTRM OPH EXAM EST PATIENT: CPT

## 2021-09-24 ENCOUNTER — APPOINTMENT (OUTPATIENT)
Dept: ENDOCRINOLOGY | Facility: CLINIC | Age: 61
End: 2021-09-24

## 2021-12-13 ENCOUNTER — RX RENEWAL (OUTPATIENT)
Age: 61
End: 2021-12-13

## 2021-12-14 ENCOUNTER — RX RENEWAL (OUTPATIENT)
Age: 61
End: 2021-12-14

## 2022-01-20 ENCOUNTER — NON-APPOINTMENT (OUTPATIENT)
Age: 62
End: 2022-01-20

## 2022-01-20 ENCOUNTER — APPOINTMENT (OUTPATIENT)
Dept: OPHTHALMOLOGY | Facility: CLINIC | Age: 62
End: 2022-01-20
Payer: COMMERCIAL

## 2022-01-20 PROCEDURE — 92083 EXTENDED VISUAL FIELD XM: CPT

## 2022-01-20 PROCEDURE — 92012 INTRM OPH EXAM EST PATIENT: CPT

## 2022-01-20 PROCEDURE — 92133 CPTRZD OPH DX IMG PST SGM ON: CPT

## 2022-01-20 PROCEDURE — ZZZZZ: CPT

## 2022-02-17 ENCOUNTER — APPOINTMENT (OUTPATIENT)
Dept: CARDIOLOGY | Facility: CLINIC | Age: 62
End: 2022-02-17
Payer: COMMERCIAL

## 2022-02-17 ENCOUNTER — NON-APPOINTMENT (OUTPATIENT)
Age: 62
End: 2022-02-17

## 2022-02-17 VITALS
OXYGEN SATURATION: 97 % | BODY MASS INDEX: 34.01 KG/M2 | HEART RATE: 79 BPM | DIASTOLIC BLOOD PRESSURE: 83 MMHG | WEIGHT: 265 LBS | SYSTOLIC BLOOD PRESSURE: 144 MMHG | HEIGHT: 74 IN

## 2022-02-17 PROCEDURE — 99214 OFFICE O/P EST MOD 30 MIN: CPT

## 2022-02-17 PROCEDURE — 93000 ELECTROCARDIOGRAM COMPLETE: CPT

## 2022-02-17 RX ORDER — GLIPIZIDE 10 MG/1
10 TABLET, FILM COATED, EXTENDED RELEASE ORAL
Qty: 180 | Refills: 0 | Status: DISCONTINUED | COMMUNITY
Start: 2019-11-27 | End: 2022-02-17

## 2022-04-05 ENCOUNTER — RX RENEWAL (OUTPATIENT)
Age: 62
End: 2022-04-05

## 2022-04-25 ENCOUNTER — APPOINTMENT (OUTPATIENT)
Dept: PULMONOLOGY | Facility: CLINIC | Age: 62
End: 2022-04-25
Payer: MEDICAID

## 2022-04-25 VITALS
DIASTOLIC BLOOD PRESSURE: 71 MMHG | HEART RATE: 70 BPM | HEIGHT: 72 IN | WEIGHT: 265 LBS | OXYGEN SATURATION: 98 % | BODY MASS INDEX: 35.89 KG/M2 | SYSTOLIC BLOOD PRESSURE: 133 MMHG

## 2022-04-25 PROCEDURE — 99204 OFFICE O/P NEW MOD 45 MIN: CPT

## 2022-04-25 NOTE — ASSESSMENT
[FreeTextEntry1] : will obtain new updated home sleep study\par he will also register his current cpap with [facility]

## 2022-04-25 NOTE — HISTORY OF PRESENT ILLNESS
[TextBox_4] : 62M hx of severe ABEBE on cpap (pressure used to be 14 but he believes it was lowered at some point).\par \par He received notice that his ter[facility] machine was recalled, not yet registered.\par He reports not being able to sleep without the cpap, would like a new machine or replacement.\par Believes current machine is > 5 years old.\par snoring, sore throat in am if doesn't use cpap\par not well rested if he doesn't use cpap.

## 2022-05-06 ENCOUNTER — LABORATORY RESULT (OUTPATIENT)
Age: 62
End: 2022-05-06

## 2022-05-06 ENCOUNTER — NON-APPOINTMENT (OUTPATIENT)
Age: 62
End: 2022-05-06

## 2022-05-06 ENCOUNTER — APPOINTMENT (OUTPATIENT)
Dept: INTERNAL MEDICINE | Facility: CLINIC | Age: 62
End: 2022-05-06
Payer: COMMERCIAL

## 2022-05-06 VITALS
DIASTOLIC BLOOD PRESSURE: 80 MMHG | OXYGEN SATURATION: 97 % | BODY MASS INDEX: 35.89 KG/M2 | HEIGHT: 72 IN | SYSTOLIC BLOOD PRESSURE: 132 MMHG | HEART RATE: 77 BPM | RESPIRATION RATE: 14 BRPM | WEIGHT: 265 LBS

## 2022-05-06 DIAGNOSIS — H91.91 UNSPECIFIED HEARING LOSS, RIGHT EAR: ICD-10-CM

## 2022-05-06 PROCEDURE — 93000 ELECTROCARDIOGRAM COMPLETE: CPT

## 2022-05-06 PROCEDURE — 99396 PREV VISIT EST AGE 40-64: CPT | Mod: 25

## 2022-05-06 NOTE — HISTORY OF PRESENT ILLNESS
[FreeTextEntry1] : Here for CPE.\par Pt c/o intermittent decreased hearing in Right  ear. Hears voice echo.\par Pt is fully vaxed against covid [de-identified] : Never a smoker. Rare alcohol.\par Last colonoscopy 10 years ago.\par Doesn't exercise much

## 2022-05-06 NOTE — HEALTH RISK ASSESSMENT
[Good] : ~his/her~ current health as good [Fair] :  ~his/her~ mood as fair [Never] : Never [Yes] : Yes [de-identified] : rare [de-identified] : Doesn't exercise much

## 2022-05-09 LAB
ALBUMIN SERPL ELPH-MCNC: 4.8 G/DL
ALP BLD-CCNC: 85 U/L
ALT SERPL-CCNC: 44 U/L
ANION GAP SERPL CALC-SCNC: 19 MMOL/L
APPEARANCE: CLEAR
AST SERPL-CCNC: 32 U/L
BASOPHILS # BLD AUTO: 0.03 K/UL
BASOPHILS NFR BLD AUTO: 0.4 %
BILIRUB SERPL-MCNC: 0.8 MG/DL
BILIRUBIN URINE: NEGATIVE
BLOOD URINE: NEGATIVE
BUN SERPL-MCNC: 18 MG/DL
CALCIUM SERPL-MCNC: 9.9 MG/DL
CHLORIDE SERPL-SCNC: 103 MMOL/L
CHOLEST SERPL-MCNC: 138 MG/DL
CO2 SERPL-SCNC: 22 MMOL/L
COLOR: YELLOW
CREAT SERPL-MCNC: 0.85 MG/DL
EGFR: 98 ML/MIN/1.73M2
EOSINOPHIL # BLD AUTO: 0.2 K/UL
EOSINOPHIL NFR BLD AUTO: 2.4 %
ESTIMATED AVERAGE GLUCOSE: 163 MG/DL
FOLATE SERPL-MCNC: 13.1 NG/ML
GLUCOSE QUALITATIVE U: NEGATIVE
GLUCOSE SERPL-MCNC: 169 MG/DL
HBA1C MFR BLD HPLC: 7.3 %
HCT VFR BLD CALC: 41.6 %
HDLC SERPL-MCNC: 46 MG/DL
HGB BLD-MCNC: 13.5 G/DL
IMM GRANULOCYTES NFR BLD AUTO: 0.4 %
KETONES URINE: NEGATIVE
LDLC SERPL CALC-MCNC: 71 MG/DL
LEUKOCYTE ESTERASE URINE: NEGATIVE
LYMPHOCYTES # BLD AUTO: 2.14 K/UL
LYMPHOCYTES NFR BLD AUTO: 25.3 %
MAN DIFF?: NORMAL
MCHC RBC-ENTMCNC: 28.5 PG
MCHC RBC-ENTMCNC: 32.5 GM/DL
MCV RBC AUTO: 87.9 FL
MONOCYTES # BLD AUTO: 0.7 K/UL
MONOCYTES NFR BLD AUTO: 8.3 %
NEUTROPHILS # BLD AUTO: 5.37 K/UL
NEUTROPHILS NFR BLD AUTO: 63.2 %
NITRITE URINE: NEGATIVE
NONHDLC SERPL-MCNC: 92 MG/DL
PH URINE: 5.5
PLATELET # BLD AUTO: 262 K/UL
POTASSIUM SERPL-SCNC: 4.2 MMOL/L
PROT SERPL-MCNC: 7.4 G/DL
PROTEIN URINE: ABNORMAL
PSA SERPL-MCNC: 0.43 NG/ML
RBC # BLD: 4.73 M/UL
RBC # FLD: 13.2 %
SODIUM SERPL-SCNC: 144 MMOL/L
SPECIFIC GRAVITY URINE: 1.03
TRIGL SERPL-MCNC: 105 MG/DL
TSH SERPL-ACNC: 1.29 UIU/ML
UROBILINOGEN URINE: NORMAL
VIT B12 SERPL-MCNC: 483 PG/ML
WBC # FLD AUTO: 8.47 K/UL

## 2022-05-17 ENCOUNTER — APPOINTMENT (OUTPATIENT)
Dept: PULMONOLOGY | Facility: CLINIC | Age: 62
End: 2022-05-17
Payer: COMMERCIAL

## 2022-05-17 PROCEDURE — 95800 SLP STDY UNATTENDED: CPT | Mod: 52

## 2022-05-18 PROCEDURE — 95800 SLP STDY UNATTENDED: CPT | Mod: 52

## 2022-05-27 ENCOUNTER — APPOINTMENT (OUTPATIENT)
Dept: PULMONOLOGY | Facility: CLINIC | Age: 62
End: 2022-05-27
Payer: COMMERCIAL

## 2022-05-27 VITALS
HEIGHT: 72 IN | BODY MASS INDEX: 35.89 KG/M2 | WEIGHT: 265 LBS | DIASTOLIC BLOOD PRESSURE: 82 MMHG | HEART RATE: 79 BPM | SYSTOLIC BLOOD PRESSURE: 124 MMHG | OXYGEN SATURATION: 97 %

## 2022-05-27 PROCEDURE — 99213 OFFICE O/P EST LOW 20 MIN: CPT

## 2022-06-02 ENCOUNTER — NON-APPOINTMENT (OUTPATIENT)
Age: 62
End: 2022-06-02

## 2022-06-02 ENCOUNTER — APPOINTMENT (OUTPATIENT)
Dept: OPHTHALMOLOGY | Facility: CLINIC | Age: 62
End: 2022-06-02
Payer: COMMERCIAL

## 2022-06-02 PROCEDURE — 92014 COMPRE OPH EXAM EST PT 1/>: CPT

## 2022-06-02 PROCEDURE — 92083 EXTENDED VISUAL FIELD XM: CPT

## 2022-06-02 PROCEDURE — 92250 FUNDUS PHOTOGRAPHY W/I&R: CPT

## 2022-06-02 PROCEDURE — 92020 GONIOSCOPY: CPT

## 2022-06-15 ENCOUNTER — RX RENEWAL (OUTPATIENT)
Age: 62
End: 2022-06-15

## 2022-07-20 NOTE — ED PROVIDER NOTE - SEVERITY
Patient is identified as having Combined Systolic and Diastolic heart failure that is Chronic. CHF is currently controlled. Latest ECHO performed and demonstrates- Results for orders placed during the hospital encounter of 04/01/22    Echo Saline Bubble? No    Interpretation Summary  · The left ventricle is normal in size with concentric hypertrophy and severely decreased systolic function.  · The estimated ejection fraction is 25-30%.  · There is severe left ventricular global hypokinesis.  · Grade II left ventricular diastolic dysfunction.  · Normal right ventricular size with mildly reduced right ventricular systolic function.  · The estimated PA systolic pressure is 52 mmHg.  · There is moderate pulmonary hypertension.  · Elevated central venous pressure (15 mmHg).  . Continue Beta Blocker and ACE/ARB and monitor clinical status closely. Monitor on telemetry. Patient is off CHF pathway.  Monitor strict Is&Os and daily weights.  Place on fluid restriction of 1.5 L. Continue to stress to patient importance of self efficacy and  on diet for CHF. Last BNP reviewed- and noted below   Recent Labs   Lab 07/18/22  0403   *   .  7/19/2022 will suggest gentle diuresis    PAIN SCALE 3 OF 10.

## 2022-07-25 ENCOUNTER — RX RENEWAL (OUTPATIENT)
Age: 62
End: 2022-07-25

## 2022-08-29 ENCOUNTER — NON-APPOINTMENT (OUTPATIENT)
Age: 62
End: 2022-08-29

## 2022-08-29 ENCOUNTER — APPOINTMENT (OUTPATIENT)
Dept: CARDIOLOGY | Facility: CLINIC | Age: 62
End: 2022-08-29

## 2022-08-29 VITALS
BODY MASS INDEX: 35.89 KG/M2 | HEIGHT: 72 IN | DIASTOLIC BLOOD PRESSURE: 83 MMHG | SYSTOLIC BLOOD PRESSURE: 145 MMHG | HEART RATE: 79 BPM | WEIGHT: 265 LBS | OXYGEN SATURATION: 97 %

## 2022-08-29 PROCEDURE — 93000 ELECTROCARDIOGRAM COMPLETE: CPT

## 2022-08-29 PROCEDURE — 99214 OFFICE O/P EST MOD 30 MIN: CPT | Mod: 25

## 2022-08-29 RX ORDER — GLIPIZIDE 10 MG/1
10 TABLET ORAL DAILY
Refills: 0 | Status: DISCONTINUED | COMMUNITY
Start: 2022-02-17 | End: 2022-08-29

## 2022-08-29 RX ORDER — AZELASTINE HYDROCHLORIDE 137 UG/1
0.1 SPRAY, METERED NASAL
Qty: 30 | Refills: 0 | Status: DISCONTINUED | COMMUNITY
Start: 2022-06-06

## 2022-10-03 ENCOUNTER — APPOINTMENT (OUTPATIENT)
Dept: PULMONOLOGY | Facility: CLINIC | Age: 62
End: 2022-10-03

## 2022-10-03 VITALS
SYSTOLIC BLOOD PRESSURE: 153 MMHG | WEIGHT: 265 LBS | BODY MASS INDEX: 35.89 KG/M2 | OXYGEN SATURATION: 99 % | HEIGHT: 72 IN | HEART RATE: 80 BPM | DIASTOLIC BLOOD PRESSURE: 80 MMHG

## 2022-10-03 PROCEDURE — 99213 OFFICE O/P EST LOW 20 MIN: CPT

## 2022-10-03 NOTE — HISTORY OF PRESENT ILLNESS
[TextBox_4] : received auto cpap\par old machine was set around 14\par new machine is now auto set 5-15\par he feels this setting is too low and would like to increase to 10-20

## 2022-10-18 ENCOUNTER — RX RENEWAL (OUTPATIENT)
Age: 62
End: 2022-10-18

## 2022-10-20 ENCOUNTER — NON-APPOINTMENT (OUTPATIENT)
Age: 62
End: 2022-10-20

## 2022-10-20 ENCOUNTER — APPOINTMENT (OUTPATIENT)
Dept: OPHTHALMOLOGY | Facility: CLINIC | Age: 62
End: 2022-10-20

## 2022-10-20 PROCEDURE — 92083 EXTENDED VISUAL FIELD XM: CPT

## 2022-10-20 PROCEDURE — 92012 INTRM OPH EXAM EST PATIENT: CPT

## 2022-12-05 ENCOUNTER — RX RENEWAL (OUTPATIENT)
Age: 62
End: 2022-12-05

## 2022-12-20 ENCOUNTER — RX RENEWAL (OUTPATIENT)
Age: 62
End: 2022-12-20

## 2023-01-21 ENCOUNTER — RX RENEWAL (OUTPATIENT)
Age: 63
End: 2023-01-21

## 2023-02-16 ENCOUNTER — NON-APPOINTMENT (OUTPATIENT)
Age: 63
End: 2023-02-16

## 2023-02-16 ENCOUNTER — APPOINTMENT (OUTPATIENT)
Dept: CARDIOLOGY | Facility: CLINIC | Age: 63
End: 2023-02-16
Payer: COMMERCIAL

## 2023-02-16 VITALS
DIASTOLIC BLOOD PRESSURE: 79 MMHG | OXYGEN SATURATION: 98 % | WEIGHT: 252 LBS | SYSTOLIC BLOOD PRESSURE: 130 MMHG | BODY MASS INDEX: 34.13 KG/M2 | HEIGHT: 72 IN | HEART RATE: 88 BPM

## 2023-02-16 PROCEDURE — 93000 ELECTROCARDIOGRAM COMPLETE: CPT

## 2023-02-16 PROCEDURE — 99214 OFFICE O/P EST MOD 30 MIN: CPT | Mod: 25

## 2023-02-24 ENCOUNTER — APPOINTMENT (OUTPATIENT)
Dept: CARDIOLOGY | Facility: CLINIC | Age: 63
End: 2023-02-24
Payer: COMMERCIAL

## 2023-02-24 PROCEDURE — 93306 TTE W/DOPPLER COMPLETE: CPT

## 2023-03-02 ENCOUNTER — NON-APPOINTMENT (OUTPATIENT)
Age: 63
End: 2023-03-02

## 2023-03-02 ENCOUNTER — APPOINTMENT (OUTPATIENT)
Dept: OPHTHALMOLOGY | Facility: CLINIC | Age: 63
End: 2023-03-02
Payer: COMMERCIAL

## 2023-03-02 PROCEDURE — 92133 CPTRZD OPH DX IMG PST SGM ON: CPT

## 2023-03-02 PROCEDURE — 92012 INTRM OPH EXAM EST PATIENT: CPT

## 2023-03-02 PROCEDURE — 92083 EXTENDED VISUAL FIELD XM: CPT

## 2023-03-27 ENCOUNTER — APPOINTMENT (OUTPATIENT)
Dept: GASTROENTEROLOGY | Facility: CLINIC | Age: 63
End: 2023-03-27
Payer: COMMERCIAL

## 2023-03-27 VITALS
OXYGEN SATURATION: 97 % | HEIGHT: 72 IN | HEART RATE: 85 BPM | BODY MASS INDEX: 34.95 KG/M2 | DIASTOLIC BLOOD PRESSURE: 78 MMHG | WEIGHT: 258 LBS | SYSTOLIC BLOOD PRESSURE: 131 MMHG

## 2023-03-27 DIAGNOSIS — M25.512 PAIN IN LEFT SHOULDER: ICD-10-CM

## 2023-03-27 DIAGNOSIS — G89.29 PAIN IN LEFT SHOULDER: ICD-10-CM

## 2023-03-27 DIAGNOSIS — R10.9 UNSPECIFIED ABDOMINAL PAIN: ICD-10-CM

## 2023-03-27 DIAGNOSIS — G47.33 OBSTRUCTIVE SLEEP APNEA (ADULT) (PEDIATRIC): ICD-10-CM

## 2023-03-27 DIAGNOSIS — Z99.89 OBSTRUCTIVE SLEEP APNEA (ADULT) (PEDIATRIC): ICD-10-CM

## 2023-03-27 DIAGNOSIS — Z12.11 ENCOUNTER FOR SCREENING FOR MALIGNANT NEOPLASM OF COLON: ICD-10-CM

## 2023-03-27 PROCEDURE — 99203 OFFICE O/P NEW LOW 30 MIN: CPT

## 2023-03-27 NOTE — HISTORY OF PRESENT ILLNESS
[FreeTextEntry1] : Mr Qiu is a pleasant 63 year old man with a history of obesity, diabetes currently on Ozempic and metformin, hypertension, and hyperlipidemia who presents to the office to discuss colorectal cancer screening.  He had a colonoscopy many years ago and reports having had polyps.  His last colonoscopy was over 10 years ago.  He denies any active GI symptoms.  He does get reflux occasionally for which he takes over-the-counter acid medication with good relief.  He recently saw his cardiologist Dr. Cardenas with whom he has been following on a regular basis (t.  He has a history of frequent PVCs, and had been on acebutolol for years.  I stopped it, and a Holter showed insignificant PVCs and no NSVT.  He reports no symptoms of chest pain, SEGURA, PND, orthopnea, LE swelling, dizziness, or syncope. His BP is slightly elevated. He has no symptoms with exertion. His A1c is controlled.  His degree of palpitations is stable. ) \par \par \par

## 2023-03-27 NOTE — ASSESSMENT
[FreeTextEntry1] : Mr Qiu is a very pleasant 63 year old man with a history of obesity, diabetes, hypertension, and hyperlipidemia who presents to discuss colorectal cancer screening.  He would be considered high risk due to personal history of polyps and lack in procedure, therefore in need of a surveillance colonoscopy.  He has well-controlled hypertension dyslipidemia and has been on Ozempic for diabetes.  He is given written instructions as well as guidance regarding holding medication prior to the procedure as well as preparation\par I explained to the patient the risks, alternatives and benefits to a colonoscopy. Risk including but not limited to bleeding, perforation, infection adverse medication reaction. Questions were answered. agreeable to proceed with the planned procedures. \par \par Reviewed and reconciled medications, allergies, PMHx, PSHx, SocHx, FMHx. Reviewed imaging, blood work, diagnostic testing, discussed with patient. Further recommendations pending results of above work up and evaluation.\par

## 2023-03-27 NOTE — PHYSICAL EXAM
[Normal] : normal bowel sounds, non-tender, no masses, soft, no no hepato-splenomegaly [Abnormal Walk] : normal gait [Normal Color / Pigmentation] : normal skin color and pigmentation

## 2023-04-25 ENCOUNTER — RX RENEWAL (OUTPATIENT)
Age: 63
End: 2023-04-25

## 2023-05-03 ENCOUNTER — TRANSCRIPTION ENCOUNTER (OUTPATIENT)
Age: 63
End: 2023-05-03

## 2023-05-04 ENCOUNTER — APPOINTMENT (OUTPATIENT)
Dept: GASTROENTEROLOGY | Facility: HOSPITAL | Age: 63
End: 2023-05-04

## 2023-05-04 ENCOUNTER — OUTPATIENT (OUTPATIENT)
Dept: OUTPATIENT SERVICES | Facility: HOSPITAL | Age: 63
LOS: 1 days | End: 2023-05-04
Payer: COMMERCIAL

## 2023-05-04 DIAGNOSIS — Z12.11 ENCOUNTER FOR SCREENING FOR MALIGNANT NEOPLASM OF COLON: ICD-10-CM

## 2023-05-04 PROCEDURE — 45385 COLONOSCOPY W/LESION REMOVAL: CPT

## 2023-05-04 PROCEDURE — 82962 GLUCOSE BLOOD TEST: CPT

## 2023-05-04 PROCEDURE — 88305 TISSUE EXAM BY PATHOLOGIST: CPT

## 2023-05-04 PROCEDURE — 88305 TISSUE EXAM BY PATHOLOGIST: CPT | Mod: 26

## 2023-05-04 PROCEDURE — 45380 COLONOSCOPY AND BIOPSY: CPT | Mod: XS

## 2023-05-08 ENCOUNTER — NON-APPOINTMENT (OUTPATIENT)
Age: 63
End: 2023-05-08

## 2023-07-06 ENCOUNTER — APPOINTMENT (OUTPATIENT)
Dept: OPHTHALMOLOGY | Facility: CLINIC | Age: 63
End: 2023-07-06
Payer: COMMERCIAL

## 2023-07-06 ENCOUNTER — NON-APPOINTMENT (OUTPATIENT)
Age: 63
End: 2023-07-06

## 2023-07-06 PROCEDURE — 92014 COMPRE OPH EXAM EST PT 1/>: CPT

## 2023-07-06 PROCEDURE — 92083 EXTENDED VISUAL FIELD XM: CPT

## 2023-07-06 PROCEDURE — 92250 FUNDUS PHOTOGRAPHY W/I&R: CPT

## 2023-07-27 ENCOUNTER — RX RENEWAL (OUTPATIENT)
Age: 63
End: 2023-07-27

## 2023-08-07 ENCOUNTER — APPOINTMENT (OUTPATIENT)
Dept: CARDIOLOGY | Facility: CLINIC | Age: 63
End: 2023-08-07
Payer: COMMERCIAL

## 2023-08-07 VITALS
BODY MASS INDEX: 34.4 KG/M2 | WEIGHT: 254 LBS | HEART RATE: 73 BPM | OXYGEN SATURATION: 98 % | SYSTOLIC BLOOD PRESSURE: 127 MMHG | DIASTOLIC BLOOD PRESSURE: 70 MMHG | HEIGHT: 72 IN

## 2023-08-07 DIAGNOSIS — I49.3 VENTRICULAR PREMATURE DEPOLARIZATION: ICD-10-CM

## 2023-08-07 PROCEDURE — 99214 OFFICE O/P EST MOD 30 MIN: CPT | Mod: 25

## 2023-08-07 PROCEDURE — 93000 ELECTROCARDIOGRAM COMPLETE: CPT

## 2023-08-07 RX ORDER — HYDROCHLOROTHIAZIDE 25 MG/1
25 TABLET ORAL DAILY
Qty: 90 | Refills: 2 | Status: DISCONTINUED | COMMUNITY
Start: 2020-04-06 | End: 2023-08-07

## 2023-08-07 NOTE — PHYSICAL EXAM
[Well Developed] : well developed [Well Nourished] : well nourished [No Acute Distress] : no acute distress [Normal Venous Pressure] : normal venous pressure [No Carotid Bruit] : no carotid bruit [Normal S1, S2] : normal S1, S2 [No Rub] : no rub [Clear Lung Fields] : clear lung fields [Good Air Entry] : good air entry [No Respiratory Distress] : no respiratory distress  [Soft] : abdomen soft [Non Tender] : non-tender [No Masses/organomegaly] : no masses/organomegaly [Normal Bowel Sounds] : normal bowel sounds [Normal Gait] : normal gait [No Edema] : no edema [No Cyanosis] : no cyanosis [No Clubbing] : no clubbing [No Varicosities] : no varicosities [No Rash] : no rash [No Skin Lesions] : no skin lesions [Moves all extremities] : moves all extremities [No Focal Deficits] : no focal deficits [Normal Speech] : normal speech [Alert and Oriented] : alert and oriented [Normal memory] : normal memory [Normal Appearance] : normal appearance [General Appearance - In No Acute Distress] : no acute distress [Normal Conjunctiva] : the conjunctiva exhibited no abnormalities [Normal Oral Mucosa] : normal oral mucosa [Normal Jugular Venous V Waves Present] : normal jugular venous V waves present [FreeTextEntry1] : No JVD [Respiration, Rhythm And Depth] : normal respiratory rhythm and effort [Exaggerated Use Of Accessory Muscles For Inspiration] : no accessory muscle use [Auscultation Breath Sounds / Voice Sounds] : lungs were clear to auscultation bilaterally [Bowel Sounds] : normal bowel sounds [Abdomen Soft] : soft [Abdomen Tenderness] : non-tender [Abnormal Walk] : normal gait [Gait - Sufficient For Exercise Testing] : the gait was sufficient for exercise testing [Nail Clubbing] : no clubbing of the fingernails [Cyanosis, Localized] : no localized cyanosis [Petechial Hemorrhages (___cm)] : no petechial hemorrhages [Skin Color & Pigmentation] : normal skin color and pigmentation [] : no rash [No Venous Stasis] : no venous stasis [Skin Lesions] : no skin lesions [Oriented To Time, Place, And Person] : oriented to person, place, and time [Affect] : the affect was normal [Mood] : the mood was normal [No Anxiety] : not feeling anxious [Not Palpable] : not palpable [Normal Rate] : normal [Premature Beats] : regular with premature beats [Normal S1] : normal S1 [Normal S2] : normal S2 [No Gallop] : no gallop heard [No Murmur] : no murmurs heard [Right Carotid Bruit] : no bruit heard over the right carotid [Left Carotid Bruit] : no bruit heard over the left carotid [1+] : left 1+ [No Abnormalities] : the abdominal aorta was not enlarged and no bruit was heard [Bruit] : no bruit heard [No Pitting Edema] : no pitting edema present

## 2023-08-07 NOTE — DISCUSSION/SUMMARY
[FreeTextEntry1] : This is a 63 year old man with a history of obesity, diabetes, hypertension, and hyperlipidemia who presents to the office for a follow up visit.  His pressure is controlled.   He will continue carvedilol 12.5 bid.  He will continue olmesartan.  He is dizzy when changing positions.  I am stopping HCTZ and hydrate better.    He will continue amlodipine 10 QD. He will continue a baby aspirin for primary prevention.  He will continue atorvastatin, and his LDL is at goal.     I will see him back in 3 months.  He knows to call the office with any issues.  We discussed the benefits of a healthy diet, regular exercise and physical activity, and weight loss in detail.  His repeat echocardiogram was normal.

## 2023-08-07 NOTE — HISTORY OF PRESENT ILLNESS
[FreeTextEntry1] : This is a 63 year old man with a history of obesity, diabetes, hypertension, and hyperlipidemia who presents to the office for a follow up visit.  He has a history of frequent PVCs, and had been on acebutolol for years.  I stopped it, and a Holter showed insignificant PVCs and no NSVT.  He reports no symptoms of chest pain, SEGURA, PND, orthopnea, LE swelling, dizziness, or syncope. His BP is controlled.  He has no symptoms with exertion. His A1c is controlled.  His degree of palpitations is stable.  He is dizzy when changing positions.

## 2023-08-07 NOTE — CARDIOLOGY SUMMARY
[de-identified] : RAJINDER OSMAN [No Ischemia] : no Ischemia [___] : [unfilled] [None] : no pulmonary hypertension [Normal] : normal LA size [Mild] : mild mitral regurgitation

## 2023-09-06 ENCOUNTER — RX RENEWAL (OUTPATIENT)
Age: 63
End: 2023-09-06

## 2023-09-06 RX ORDER — CARVEDILOL 12.5 MG/1
12.5 TABLET, FILM COATED ORAL TWICE DAILY
Qty: 180 | Refills: 2 | Status: ACTIVE | COMMUNITY
Start: 2020-02-04 | End: 1900-01-01

## 2023-09-15 ENCOUNTER — RX RENEWAL (OUTPATIENT)
Age: 63
End: 2023-09-15

## 2023-09-15 RX ORDER — OLMESARTAN MEDOXOMIL 20 MG/1
20 TABLET, FILM COATED ORAL
Qty: 90 | Refills: 3 | Status: ACTIVE | COMMUNITY
Start: 2021-03-31 | End: 1900-01-01

## 2023-10-04 ENCOUNTER — RX RENEWAL (OUTPATIENT)
Age: 63
End: 2023-10-04

## 2023-10-04 ENCOUNTER — TRANSCRIPTION ENCOUNTER (OUTPATIENT)
Age: 63
End: 2023-10-04

## 2023-11-02 ENCOUNTER — NON-APPOINTMENT (OUTPATIENT)
Age: 63
End: 2023-11-02

## 2023-11-02 ENCOUNTER — APPOINTMENT (OUTPATIENT)
Dept: OPHTHALMOLOGY | Facility: CLINIC | Age: 63
End: 2023-11-02
Payer: COMMERCIAL

## 2023-11-02 PROCEDURE — 92012 INTRM OPH EXAM EST PATIENT: CPT

## 2023-11-30 ENCOUNTER — NON-APPOINTMENT (OUTPATIENT)
Age: 63
End: 2023-11-30

## 2023-11-30 ENCOUNTER — APPOINTMENT (OUTPATIENT)
Dept: CARDIOLOGY | Facility: CLINIC | Age: 63
End: 2023-11-30
Payer: COMMERCIAL

## 2023-11-30 VITALS
HEIGHT: 72 IN | OXYGEN SATURATION: 97 % | DIASTOLIC BLOOD PRESSURE: 83 MMHG | BODY MASS INDEX: 35.76 KG/M2 | SYSTOLIC BLOOD PRESSURE: 134 MMHG | WEIGHT: 264 LBS | HEART RATE: 83 BPM

## 2023-11-30 PROCEDURE — 99214 OFFICE O/P EST MOD 30 MIN: CPT | Mod: 25

## 2023-11-30 PROCEDURE — 93000 ELECTROCARDIOGRAM COMPLETE: CPT

## 2023-11-30 RX ORDER — SODIUM SULFATE, MAGNESIUM SULFATE, AND POTASSIUM CHLORIDE 17.75; 2.7; 2.25 G/1; G/1; G/1
1479-225-188 TABLET ORAL
Qty: 1 | Refills: 0 | Status: DISCONTINUED | COMMUNITY
Start: 2023-03-27 | End: 2023-11-30

## 2023-11-30 RX ORDER — SEMAGLUTIDE 1.34 MG/ML
2 INJECTION, SOLUTION SUBCUTANEOUS
Qty: 3 | Refills: 3 | Status: DISCONTINUED | COMMUNITY
Start: 2019-03-05 | End: 2023-11-30

## 2023-12-26 ENCOUNTER — RX RENEWAL (OUTPATIENT)
Age: 63
End: 2023-12-26

## 2024-01-24 ENCOUNTER — RX RENEWAL (OUTPATIENT)
Age: 64
End: 2024-01-24

## 2024-02-02 ENCOUNTER — NON-APPOINTMENT (OUTPATIENT)
Age: 64
End: 2024-02-02

## 2024-02-02 ENCOUNTER — APPOINTMENT (OUTPATIENT)
Dept: INTERNAL MEDICINE | Facility: CLINIC | Age: 64
End: 2024-02-02
Payer: MEDICARE

## 2024-02-02 ENCOUNTER — LABORATORY RESULT (OUTPATIENT)
Age: 64
End: 2024-02-02

## 2024-02-02 VITALS
TEMPERATURE: 97.8 F | HEART RATE: 80 BPM | DIASTOLIC BLOOD PRESSURE: 84 MMHG | HEIGHT: 72 IN | OXYGEN SATURATION: 98 % | SYSTOLIC BLOOD PRESSURE: 132 MMHG | RESPIRATION RATE: 16 BRPM | WEIGHT: 265 LBS | BODY MASS INDEX: 35.89 KG/M2

## 2024-02-02 DIAGNOSIS — Z00.00 ENCOUNTER FOR GENERAL ADULT MEDICAL EXAMINATION W/OUT ABNORMAL FINDINGS: ICD-10-CM

## 2024-02-02 DIAGNOSIS — I10 ESSENTIAL (PRIMARY) HYPERTENSION: ICD-10-CM

## 2024-02-02 PROCEDURE — G0439: CPT

## 2024-02-02 PROCEDURE — 93000 ELECTROCARDIOGRAM COMPLETE: CPT

## 2024-02-02 RX ORDER — TIMOLOL MALEATE 5 MG/ML
0.5 SOLUTION/ DROPS OPHTHALMIC
Refills: 0 | Status: ACTIVE | COMMUNITY

## 2024-02-02 NOTE — HEALTH RISK ASSESSMENT
[Good] : ~his/her~  mood as  good [Yes] : Yes [Never] : Never [de-identified] : Rare [de-identified] : Doesn't exercise much

## 2024-02-02 NOTE — HISTORY OF PRESENT ILLNESS
[FreeTextEntry1] : Here for CPE. Overall feeling well [de-identified] : Never a smoker. Rare alcohol. Had colonoscopy within past year. Doesn't exercise frequently

## 2024-02-04 LAB
ALBUMIN SERPL ELPH-MCNC: 4.8 G/DL
ALP BLD-CCNC: 88 U/L
ALT SERPL-CCNC: 28 U/L
ANION GAP SERPL CALC-SCNC: 14 MMOL/L
APPEARANCE: CLEAR
AST SERPL-CCNC: 20 U/L
BILIRUB SERPL-MCNC: 0.7 MG/DL
BILIRUBIN URINE: NEGATIVE
BLOOD URINE: NEGATIVE
BUN SERPL-MCNC: 19 MG/DL
CALCIUM SERPL-MCNC: 9.8 MG/DL
CHLORIDE SERPL-SCNC: 103 MMOL/L
CHOLEST SERPL-MCNC: 149 MG/DL
CO2 SERPL-SCNC: 25 MMOL/L
COLOR: YELLOW
CREAT SERPL-MCNC: 0.93 MG/DL
EGFR: 91 ML/MIN/1.73M2
ESTIMATED AVERAGE GLUCOSE: 128 MG/DL
FOLATE SERPL-MCNC: 10 NG/ML
GLUCOSE QUALITATIVE U: 250 MG/DL
GLUCOSE SERPL-MCNC: 154 MG/DL
HBA1C MFR BLD HPLC: 6.1 %
HCT VFR BLD CALC: 42.4 %
HDLC SERPL-MCNC: 48 MG/DL
HGB BLD-MCNC: 14 G/DL
KETONES URINE: NEGATIVE MG/DL
LDLC SERPL CALC-MCNC: 74 MG/DL
LEUKOCYTE ESTERASE URINE: NEGATIVE
MCHC RBC-ENTMCNC: 29.4 PG
MCHC RBC-ENTMCNC: 33 GM/DL
MCV RBC AUTO: 88.9 FL
NITRITE URINE: NEGATIVE
NONHDLC SERPL-MCNC: 101 MG/DL
PH URINE: 5.5
PLATELET # BLD AUTO: 320 K/UL
POTASSIUM SERPL-SCNC: 4.6 MMOL/L
PROT SERPL-MCNC: 7.5 G/DL
PROTEIN URINE: 30 MG/DL
PSA SERPL-MCNC: 0.54 NG/ML
RBC # BLD: 4.77 M/UL
RBC # FLD: 13.5 %
SODIUM SERPL-SCNC: 142 MMOL/L
SPECIFIC GRAVITY URINE: 1.02
TRIGL SERPL-MCNC: 156 MG/DL
TSH SERPL-ACNC: 0.99 UIU/ML
UROBILINOGEN URINE: 1 MG/DL
VIT B12 SERPL-MCNC: 473 PG/ML
WBC # FLD AUTO: 8.6 K/UL

## 2024-02-06 NOTE — ED PROVIDER NOTE - CPE EDP RESP NORM
[] [facility]  Outpatient Rehabilitation &  Therapy  [address]  [phone]  F: [phone] [] [facility]  Outpatient Rehabilitation &  Therapy  [address]   [address]  P: [phone]  F: [phone] [] [facility]  Outpatient Rehabilitation &  Therapy  [address]  P: [phone]  F: [phone] [x] [facility]  Outpatient Rehabilitation &  Therapy  [address]  P: [phone]  F: [phone] [] [facility]  Outpatient Rehabilitation &  Therapy  [address]   [address]   P: [phone]  F: [phone]  [] [facility]  Outpatient Rehabilitation &  Therapy  [address]   P: [phone]  F: [phone] [] [facility]  Outpatient Rehabilitation &  Therapy  [address]  [address]  P: [phone]  F: [phone] [] [facility]  Outpatient Rehabilitation &  Therapy  [address] [place]  [address]  P: [phone]  F: [phone] [] [facility]  Outpatient Rehabilitation &  Therapy  [address]  P: [phone]  F: [phone]      Physical Therapy Daily Treatment Note    Date:  2024  Patient Name:  Bird Archer    :  2003  MRN: [id number]  Physician: Marvin Chanel DPM  Insurance: [employer]; [place] yr; 60/48vs; no auth req; hard max; PT/OT/ST/Pulm comb; no pt resp-Family OOP met for    Medical Diagnosis: 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 Stated that he is still having some minor  normal...

## 2024-02-15 ENCOUNTER — APPOINTMENT (OUTPATIENT)
Dept: ENDOCRINOLOGY | Facility: CLINIC | Age: 64
End: 2024-02-15
Payer: MEDICARE

## 2024-02-15 PROCEDURE — 99214 OFFICE O/P EST MOD 30 MIN: CPT

## 2024-02-15 PROCEDURE — 99204 OFFICE O/P NEW MOD 45 MIN: CPT

## 2024-02-15 RX ORDER — SEMAGLUTIDE 1.34 MG/ML
4 INJECTION, SOLUTION SUBCUTANEOUS
Qty: 9 | Refills: 2 | Status: ACTIVE | COMMUNITY
Start: 2021-12-13 | End: 1900-01-01

## 2024-02-15 RX ORDER — GLIMEPIRIDE 4 MG/1
4 TABLET ORAL DAILY
Qty: 180 | Refills: 2 | Status: ACTIVE | COMMUNITY
Start: 1900-01-01 | End: 1900-01-01

## 2024-02-15 RX ORDER — LANCETS 28 GAUGE
EACH MISCELLANEOUS
Qty: 2 | Refills: 2 | Status: ACTIVE | COMMUNITY
Start: 2024-02-15 | End: 1900-01-01

## 2024-02-15 RX ORDER — BLOOD SUGAR DIAGNOSTIC
STRIP MISCELLANEOUS
Qty: 2 | Refills: 2 | Status: ACTIVE | COMMUNITY
Start: 2019-06-18 | End: 1900-01-01

## 2024-02-15 NOTE — ASSESSMENT
[FreeTextEntry1] : 64 year old male with past medical history of Diabetes Mellitus Type 2, HTN, HLD who presents for management of his diabetes  1. DM 2- controlled, uncomplicated Patient counseled on the importance of diabetic control and risk of complications. We discussed about microvascular disease and macrovascular disease. We discussed the importance of ophthalmology evaluations annually or more frequent as necessary. We also discussed the importance of diabetes foot care. Some form of glucose monitoring is always advised. Maintaining a low carbohydrate/ADA diet and physical activity was discussed. Patient's diet and the necessary changes discussed.  Reviewed over glycemic goals. Discussed other options for diabetes control.    Cont Metformin 1000 mg BID Cont Glimepiride 8 mg QD Cont Ozempic 1 mg Qweekly Check fsg Cont ADA diet Cont exercise will send for microalbumin Opthalmology Visit up to date Foot Exam at follow up   2. HLD- stable Well controlled on Atorvastatin

## 2024-02-15 NOTE — HISTORY OF PRESENT ILLNESS
[Home] : at home, [unfilled] , at the time of the visit. [Medical Office: ([facility])___] : at the medical office located in  [Verbal consent obtained from patient] : the patient, [unfilled] [FreeTextEntry1] : Mr. HARIS KELLEY  is a 64 year old male with past medical history of Diabetes Mellitus Type 2, HTN, HLD who presents for management of his diabetes.  Patient is establishing due to his endocrinologist not accepting his insurance.  He has been stable on his medications.  Patient denies any history of diabetic retinopathy, nephropathy or neuropathy. He denies any blurry vision, polyuria, polydipsia and numbness/tingling in the extremities.  On his last visit he did have an elevated microalbumin.   POCT Glucose: mg/dL POCT Hga1c: %   Diabetes first diagnosed: Age 44 Type:2   Current diabetic regimen: Metformin 1000 mg BID Glimepiride 8 mg QD Ozempic 1 mg Qweekly Trulicity (stopped) Other relevant medications:  Self monitoring blood glucose : Glucometer: 2x times per day   SMBG: Pre-breakfast:150s Pre-lunch: Pre-dinner:150s bedtime:  Hypoglycemia: In the summertime when he is on his boat and active   Diet: Low carb and sugar  Exercise: More in the summer, in the winter may do treadmill  Urine micro: Urine protein high lipid profile:LDL 74 (2/2024), last hba1c:6.1% (2/2024), eye exam:2024 diabetic foot exam/podiatry:

## 2024-03-06 LAB
CREAT SPEC-SCNC: 195 MG/DL
MICROALBUMIN 24H UR DL<=1MG/L-MCNC: 29.8 MG/DL
MICROALBUMIN/CREAT 24H UR-RTO: 153 MG/G

## 2024-03-07 ENCOUNTER — NON-APPOINTMENT (OUTPATIENT)
Age: 64
End: 2024-03-07

## 2024-03-07 ENCOUNTER — APPOINTMENT (OUTPATIENT)
Dept: OPHTHALMOLOGY | Facility: CLINIC | Age: 64
End: 2024-03-07
Payer: MEDICARE

## 2024-03-07 PROCEDURE — 92083 EXTENDED VISUAL FIELD XM: CPT

## 2024-03-07 PROCEDURE — 92015 DETERMINE REFRACTIVE STATE: CPT

## 2024-03-07 PROCEDURE — 92012 INTRM OPH EXAM EST PATIENT: CPT

## 2024-03-31 NOTE — ED PROVIDER NOTE - SCRIBE NAME
"Goal Outcome Evaluation:      Plan of Care Reviewed With: patient    Overall Patient Progress: improvingOverall Patient Progress: improving    Afebrile. Received IV Dilaudid times one this morning for abdominal pain, otherwise pain controlled on oral pain medication. Receiving Zofran for C/O intermittent nausea. C/O reflux; receiving Tums. Hyperactive bowel sounds. Hypoactive bowel sounds in LUQ. Abdomen soft and tender. Passing flatus. C/O dizziness improved following IVF bolus and increased PO fluid intake. Plan: IV Steroids. Encourage ambulation. GI consulting. Monitor and provide for needs.     Problem: Adult Inpatient Plan of Care  Goal: Plan of Care Review  Description: The Plan of Care Review/Shift note should be completed every shift.  The Outcome Evaluation is a brief statement about your assessment that the patient is improving, declining, or no change.  This information will be displayed automatically on your shift  note.  Outcome: Progressing  Flowsheets (Taken 3/31/2024 5179)  Plan of Care Reviewed With: patient  Overall Patient Progress: improving  Goal: Patient-Specific Goal (Individualized)  Description: You can add care plan individualizations to a care plan. Examples of Individualization might be:  \"Parent requests to be called daily at 9am for status\", \"I have a hard time hearing out of my right ear\", or \"Do not touch me to wake me up as it startles  me\".  Outcome: Progressing  Goal: Absence of Hospital-Acquired Illness or Injury  Outcome: Progressing  Intervention: Identify and Manage Fall Risk  Recent Flowsheet Documentation  Taken 3/31/2024 0745 by Nevin Pierre, RN  Safety Promotion/Fall Prevention:   clutter free environment maintained   safety round/check completed  Intervention: Prevent Skin Injury  Recent Flowsheet Documentation  Taken 3/31/2024 0745 by Nevin Pierre, RN  Body Position: position changed independently  Skin Protection: adhesive use limited  Device Skin Pressure " Protection: adhesive use limited  Intervention: Prevent and Manage VTE (Venous Thromboembolism) Risk  Recent Flowsheet Documentation  Taken 3/31/2024 0745 by Nevin Pierre RN  VTE Prevention/Management: SCDs (sequential compression devices) off  Intervention: Prevent Infection  Recent Flowsheet Documentation  Taken 3/31/2024 0745 by Nevin Pierre RN  Infection Prevention:   hand hygiene promoted   rest/sleep promoted   single patient room provided  Goal: Optimal Comfort and Wellbeing  Outcome: Progressing  Intervention: Monitor Pain and Promote Comfort  Recent Flowsheet Documentation  Taken 3/31/2024 1822 by Nevin Pierre RN  Pain Management Interventions: medication (see MAR)  Taken 3/31/2024 1456 by Nevin Pierre RN  Pain Management Interventions: medication (see MAR)  Taken 3/31/2024 1210 by Nevin Pierre RN  Pain Management Interventions: medication (see MAR)  Taken 3/31/2024 0915 by Nevin Pierre RN  Pain Management Interventions:   distraction   rest  Taken 3/31/2024 0848 by Nevin Pierre RN  Pain Management Interventions: medication (see MAR)  Taken 3/31/2024 0747 by Nevin Pierre RN  Pain Management Interventions: heat applied  Goal: Readiness for Transition of Care  Outcome: Progressing     Problem: Pain Acute  Goal: Optimal Pain Control and Function  Outcome: Progressing  Intervention: Develop Pain Management Plan  Recent Flowsheet Documentation  Taken 3/31/2024 1822 by Nevin Pierre RN  Pain Management Interventions: medication (see MAR)  Taken 3/31/2024 1456 by Nevin Pierre RN  Pain Management Interventions: medication (see MAR)  Taken 3/31/2024 1210 by Nevin Pierre RN  Pain Management Interventions: medication (see MAR)  Taken 3/31/2024 0915 by Nevin Pierre RN  Pain Management Interventions:   distraction   rest  Taken 3/31/2024 0848 by Nevin Pierre RN  Pain Management Interventions: medication (see  MAR)  Taken 3/31/2024 0747 by Nevin Pierre RN  Pain Management Interventions: heat applied  Intervention: Prevent or Manage Pain  Recent Flowsheet Documentation  Taken 3/31/2024 0745 by Nevin Pierre RN  Sensory Stimulation Regulation: care clustered  Sleep/Rest Enhancement: regular sleep/rest pattern promoted  Bowel Elimination Promotion:   adequate fluid intake promoted   ambulation promoted  Complementary Therapy: aromatherapy utilized  Medication Review/Management: medications reviewed  Intervention: Optimize Psychosocial Wellbeing  Recent Flowsheet Documentation  Taken 3/31/2024 0745 by Nevin Pierre RN  Supportive Measures:   active listening utilized   decision-making supported   goal-setting facilitated   positive reinforcement provided   problem-solving facilitated   self-care encouraged   self-responsibility promoted   verbalization of feelings encouraged     Problem: Bowel Disease, Inflammatory (Ulcerative Colitis or Crohn's Disease)  Goal: Optimal Adaptation to Chronic Illness  Outcome: Progressing  Intervention: Support Psychosocial Response to Illness  Recent Flowsheet Documentation  Taken 3/31/2024 0745 by Nevin Pierre RN  Supportive Measures:   active listening utilized   decision-making supported   goal-setting facilitated   positive reinforcement provided   problem-solving facilitated   self-care encouraged   self-responsibility promoted   verbalization of feelings encouraged  Goal: Diarrhea Symptom Relief  Outcome: Progressing  Intervention: Manage Diarrhea  Recent Flowsheet Documentation  Taken 3/31/2024 0745 by Nevin Pierre RN  Fluid/Electrolyte Management: fluids provided  Goal: Absence of Infection Signs and Symptoms  Outcome: Progressing  Goal: Optimal Nutrition Delivery  Outcome: Progressing  Goal: Optimal Pain Control and Function  Outcome: Progressing  Intervention: Prevent or Manage Pain  Recent Flowsheet Documentation  Taken 3/31/2024 1822 by  Nevin Pierre, RN  Pain Management Interventions: medication (see MAR)  Taken 3/31/2024 1456 by Nevin Pirere RN  Pain Management Interventions: medication (see MAR)  Taken 3/31/2024 1210 by Nevin Pierre RN  Pain Management Interventions: medication (see MAR)  Taken 3/31/2024 0915 by Nevin Pierre RN  Pain Management Interventions:   distraction   rest  Taken 3/31/2024 0848 by Nevin Pierre, RN  Pain Management Interventions: medication (see MAR)  Taken 3/31/2024 0747 by Nevin Pierre RN  Pain Management Interventions: heat applied  Taken 3/31/2024 0745 by Nevin Pierre RN  Sleep/Rest Enhancement: regular sleep/rest pattern promoted  Complementary Therapy: aromatherapy utilized             Duyen Upton

## 2024-04-16 ENCOUNTER — RX RENEWAL (OUTPATIENT)
Age: 64
End: 2024-04-16

## 2024-05-28 ENCOUNTER — APPOINTMENT (OUTPATIENT)
Dept: CARDIOLOGY | Facility: CLINIC | Age: 64
End: 2024-05-28
Payer: MEDICARE

## 2024-05-28 ENCOUNTER — NON-APPOINTMENT (OUTPATIENT)
Age: 64
End: 2024-05-28

## 2024-05-28 VITALS
WEIGHT: 260 LBS | BODY MASS INDEX: 35.21 KG/M2 | HEIGHT: 72 IN | OXYGEN SATURATION: 97 % | HEART RATE: 65 BPM | SYSTOLIC BLOOD PRESSURE: 114 MMHG | DIASTOLIC BLOOD PRESSURE: 74 MMHG

## 2024-05-28 PROCEDURE — 99214 OFFICE O/P EST MOD 30 MIN: CPT

## 2024-05-28 PROCEDURE — G2211 COMPLEX E/M VISIT ADD ON: CPT

## 2024-05-28 PROCEDURE — 93000 ELECTROCARDIOGRAM COMPLETE: CPT

## 2024-05-28 NOTE — DISCUSSION/SUMMARY
[FreeTextEntry1] : This is a 64 year old man with a history of obesity, diabetes, hypertension, and hyperlipidemia who presents to the office for a follow up visit.  His pressure is controlled.   He will continue carvedilol 12.5 bid.  He will continue olmesartan.   He will continue amlodipine 10 QD. He will continue a baby aspirin for primary prevention.  He will continue atorvastatin, and his LDL is at goal.     I will see him back in 6 months.  He knows to call the office with any issues.  We discussed the benefits of a healthy diet, regular exercise and physical activity, and weight loss in detail.   [EKG obtained to assist in diagnosis and management of assessed problem(s)] : EKG obtained to assist in diagnosis and management of assessed problem(s)

## 2024-05-28 NOTE — HISTORY OF PRESENT ILLNESS
[FreeTextEntry1] : This is a 64 year old man with a history of obesity, diabetes, hypertension, and hyperlipidemia who presents to the office for a follow up visit.  He has a history of frequent PVCs, and had been on acebutolol for years.  I stopped it, and a Holter showed insignificant PVCs and no NSVT.  He reports no symptoms of chest pain, SEGURA, PND, orthopnea, LE swelling, dizziness, or syncope. His BP is controlled.  He has no symptoms with exertion. His A1c is controlled.  His degree of palpitations is stable.   He has not lost any significant weight.

## 2024-05-28 NOTE — CARDIOLOGY SUMMARY
[de-identified] : NAKITA.  IRBBB [No Ischemia] : no Ischemia [___] : [unfilled] [None] : no pulmonary hypertension [Normal] : normal LA size [Mild] : mild mitral regurgitation

## 2024-05-31 ENCOUNTER — LABORATORY RESULT (OUTPATIENT)
Age: 64
End: 2024-05-31

## 2024-06-06 ENCOUNTER — APPOINTMENT (OUTPATIENT)
Dept: ENDOCRINOLOGY | Facility: CLINIC | Age: 64
End: 2024-06-06
Payer: MEDICARE

## 2024-06-06 VITALS
WEIGHT: 260 LBS | SYSTOLIC BLOOD PRESSURE: 130 MMHG | HEART RATE: 75 BPM | DIASTOLIC BLOOD PRESSURE: 81 MMHG | HEIGHT: 72 IN | OXYGEN SATURATION: 97 % | BODY MASS INDEX: 35.21 KG/M2

## 2024-06-06 DIAGNOSIS — E78.5 HYPERLIPIDEMIA, UNSPECIFIED: ICD-10-CM

## 2024-06-06 DIAGNOSIS — E11.9 TYPE 2 DIABETES MELLITUS W/OUT COMPLICATIONS: ICD-10-CM

## 2024-06-06 PROCEDURE — G2211 COMPLEX E/M VISIT ADD ON: CPT

## 2024-06-06 PROCEDURE — 99214 OFFICE O/P EST MOD 30 MIN: CPT

## 2024-06-06 NOTE — ASSESSMENT
[FreeTextEntry1] : 64 year old male with past medical history of Diabetes Mellitus Type 2, HTN, HLD who presents for management of his diabetes  1. DM 2- controlled, uncomplicated Patient counseled on the importance of diabetic control and risk of complications. We discussed about microvascular disease and macrovascular disease. We discussed the importance of ophthalmology evaluations annually or more frequent as necessary. We also discussed the importance of diabetes foot care. Some form of glucose monitoring is always advised. Maintaining a low carbohydrate/ADA diet and physical activity was discussed. Patient's diet and the necessary changes discussed.  Reviewed over glycemic goals. Discussed other options for diabetes control.    Cont Metformin 1000 mg BID Cont Glimepiride 8 mg QD Cont Ozempic 1 mg Qweekly Check fsg Cont ADA diet Cont exercise Blood tests up-to-date Opthalmology Visit up to date Foot Exam up-to-date   2. HLD- stable Well controlled on Atorvastatin

## 2024-06-06 NOTE — HISTORY OF PRESENT ILLNESS
[FreeTextEntry1] : Mr. HARIS KELLEY  is a 64 year old male with past medical history of Diabetes Mellitus Type 2, HTN, HLD who presents for management of his diabetes.    POCT Glucose: mg/dL POCT Hga1c: %   Diabetes first diagnosed: Age 44 Type:2   Current diabetic regimen: Metformin 1000 mg BID Glimepiride 8 mg QD Ozempic 1 mg Qweekly Trulicity (stopped) Other relevant medications:  Self monitoring blood glucose : Glucometer: 2x times per day   SMBG: Pre-breakfast:150s Pre-lunch: Pre-dinner:150s bedtime:  Hypoglycemia: In the summertime when he is on his boat and active   Diet: Low carb and sugar  Exercise: More in the summer, in the winter may do treadmill  Urine micro: Urine protein high lipid profile:LDL 74 (2/2024), last hba1c:6.1% (2/2024), eye exam:2024 diabetic foot exam/podiatry:

## 2024-06-06 NOTE — PHYSICAL EXAM
[Alert] : alert [Well Nourished] : well nourished [No Acute Distress] : no acute distress [Well Developed] : well developed [Normal Sclera/Conjunctiva] : normal sclera/conjunctiva [EOMI] : extra ocular movement intact [No Proptosis] : no proptosis [Normal Oropharynx] : the oropharynx was normal [Thyroid Not Enlarged] : the thyroid was not enlarged [No Thyroid Nodules] : no palpable thyroid nodules [No Respiratory Distress] : no respiratory distress [No Accessory Muscle Use] : no accessory muscle use [Clear to Auscultation] : lungs were clear to auscultation bilaterally [Normal S1, S2] : normal S1 and S2 [Normal Rate] : heart rate was normal [Regular Rhythm] : with a regular rhythm [No Edema] : no peripheral edema [Pedal Pulses Normal] : the pedal pulses are present [Normal Bowel Sounds] : normal bowel sounds [Not Tender] : non-tender [Not Distended] : not distended [Soft] : abdomen soft [Normal Anterior Cervical Nodes] : no anterior cervical lymphadenopathy [Normal Posterior Cervical Nodes] : no posterior cervical lymphadenopathy [No Spinal Tenderness] : no spinal tenderness [Spine Straight] : spine straight [No Stigmata of Cushings Syndrome] : no stigmata of Cushings Syndrome [Normal Gait] : normal gait [Normal Strength/Tone] : muscle strength and tone were normal [No Rash] : no rash [Acanthosis Nigricans] : no acanthosis nigricans [Right foot was examined, including] : right foot ~C was examined, including visual inspection with sensory and pulse exams [Left foot was examined, including] : left foot ~C was examined, including visual inspection with sensory and pulse exams [Normal] : normal [Full ROM] : with full range of motion [2+] : 2+ in the dorsalis pedis [Diminished Throughout Both Feet] : normal tactile sensation with monofilament testing throughout both feet [Normal Reflexes] : deep tendon reflexes were 2+ and symmetric [No Tremors] : no tremors [Oriented x3] : oriented to person, place, and time

## 2024-07-03 ENCOUNTER — RX RENEWAL (OUTPATIENT)
Age: 64
End: 2024-07-03

## 2024-07-19 ENCOUNTER — NON-APPOINTMENT (OUTPATIENT)
Age: 64
End: 2024-07-19

## 2024-07-19 ENCOUNTER — APPOINTMENT (OUTPATIENT)
Dept: OPHTHALMOLOGY | Facility: CLINIC | Age: 64
End: 2024-07-19
Payer: MEDICARE

## 2024-07-19 PROCEDURE — 92083 EXTENDED VISUAL FIELD XM: CPT

## 2024-07-19 PROCEDURE — 92014 COMPRE OPH EXAM EST PT 1/>: CPT

## 2024-07-19 PROCEDURE — 92133 CPTRZD OPH DX IMG PST SGM ON: CPT

## 2024-08-26 ENCOUNTER — RX RENEWAL (OUTPATIENT)
Age: 64
End: 2024-08-26

## 2024-09-11 ENCOUNTER — RX RENEWAL (OUTPATIENT)
Age: 64
End: 2024-09-11

## 2024-09-16 RX ORDER — LANCETS 30 GAUGE
EACH MISCELLANEOUS
Qty: 3 | Refills: 2 | Status: ACTIVE | COMMUNITY
Start: 2024-09-16 | End: 1900-01-01

## 2024-09-16 RX ORDER — BLOOD SUGAR DIAGNOSTIC
STRIP MISCELLANEOUS 3 TIMES DAILY
Qty: 3 | Refills: 1 | Status: ACTIVE | COMMUNITY
Start: 2024-09-16 | End: 1900-01-01

## 2024-09-16 RX ORDER — LANCING DEVICE/LANCETS
KIT MISCELLANEOUS
Qty: 1 | Refills: 0 | Status: ACTIVE | COMMUNITY
Start: 2024-09-16 | End: 1900-01-01

## 2024-09-16 RX ORDER — BLOOD-GLUCOSE METER
EACH MISCELLANEOUS
Qty: 1 | Refills: 0 | Status: ACTIVE | COMMUNITY
Start: 2024-09-16 | End: 1900-01-01

## 2024-10-03 ENCOUNTER — NON-APPOINTMENT (OUTPATIENT)
Age: 64
End: 2024-10-03

## 2024-10-03 ENCOUNTER — APPOINTMENT (OUTPATIENT)
Dept: OPHTHALMOLOGY | Facility: CLINIC | Age: 64
End: 2024-10-03
Payer: MEDICARE

## 2024-10-03 PROCEDURE — 92012 INTRM OPH EXAM EST PATIENT: CPT

## 2024-10-11 ENCOUNTER — RX RENEWAL (OUTPATIENT)
Age: 64
End: 2024-10-11

## 2024-10-21 ENCOUNTER — RX RENEWAL (OUTPATIENT)
Age: 64
End: 2024-10-21

## 2024-10-21 DIAGNOSIS — E11.9 TYPE 2 DIABETES MELLITUS W/OUT COMPLICATIONS: ICD-10-CM

## 2024-10-22 ENCOUNTER — APPOINTMENT (OUTPATIENT)
Dept: ENDOCRINOLOGY | Facility: CLINIC | Age: 64
End: 2024-10-22

## 2024-11-21 ENCOUNTER — APPOINTMENT (OUTPATIENT)
Dept: ENDOCRINOLOGY | Facility: CLINIC | Age: 64
End: 2024-11-21
Payer: MEDICARE

## 2024-11-21 DIAGNOSIS — E11.9 TYPE 2 DIABETES MELLITUS W/OUT COMPLICATIONS: ICD-10-CM

## 2024-11-21 PROCEDURE — 99214 OFFICE O/P EST MOD 30 MIN: CPT

## 2024-11-26 ENCOUNTER — APPOINTMENT (OUTPATIENT)
Dept: CARDIOLOGY | Facility: CLINIC | Age: 64
End: 2024-11-26
Payer: MEDICARE

## 2024-11-26 ENCOUNTER — NON-APPOINTMENT (OUTPATIENT)
Age: 64
End: 2024-11-26

## 2024-11-26 VITALS
OXYGEN SATURATION: 97 % | HEART RATE: 87 BPM | WEIGHT: 265 LBS | BODY MASS INDEX: 35.89 KG/M2 | SYSTOLIC BLOOD PRESSURE: 144 MMHG | DIASTOLIC BLOOD PRESSURE: 86 MMHG | HEIGHT: 72 IN

## 2024-11-26 VITALS — SYSTOLIC BLOOD PRESSURE: 130 MMHG | DIASTOLIC BLOOD PRESSURE: 80 MMHG

## 2024-11-26 DIAGNOSIS — I10 ESSENTIAL (PRIMARY) HYPERTENSION: ICD-10-CM

## 2024-11-26 DIAGNOSIS — E78.5 HYPERLIPIDEMIA, UNSPECIFIED: ICD-10-CM

## 2024-11-26 PROCEDURE — 99214 OFFICE O/P EST MOD 30 MIN: CPT

## 2024-11-26 PROCEDURE — G2211 COMPLEX E/M VISIT ADD ON: CPT

## 2024-11-26 PROCEDURE — 93000 ELECTROCARDIOGRAM COMPLETE: CPT

## 2024-12-10 ENCOUNTER — APPOINTMENT (OUTPATIENT)
Dept: OPHTHALMOLOGY | Facility: CLINIC | Age: 64
End: 2024-12-10
Payer: MEDICARE

## 2024-12-10 ENCOUNTER — NON-APPOINTMENT (OUTPATIENT)
Age: 64
End: 2024-12-10

## 2024-12-10 PROCEDURE — 99214 OFFICE O/P EST MOD 30 MIN: CPT

## 2025-01-03 ENCOUNTER — RX RENEWAL (OUTPATIENT)
Age: 65
End: 2025-01-03

## 2025-01-16 ENCOUNTER — RX RENEWAL (OUTPATIENT)
Age: 65
End: 2025-01-16

## 2025-02-28 ENCOUNTER — RX RENEWAL (OUTPATIENT)
Age: 65
End: 2025-02-28

## 2025-03-06 ENCOUNTER — APPOINTMENT (OUTPATIENT)
Dept: OPHTHALMOLOGY | Facility: CLINIC | Age: 65
End: 2025-03-06
Payer: MEDICARE

## 2025-03-06 ENCOUNTER — NON-APPOINTMENT (OUTPATIENT)
Age: 65
End: 2025-03-06

## 2025-03-06 PROCEDURE — 99214 OFFICE O/P EST MOD 30 MIN: CPT

## 2025-03-10 ENCOUNTER — RX RENEWAL (OUTPATIENT)
Age: 65
End: 2025-03-10

## 2025-03-12 ENCOUNTER — APPOINTMENT (OUTPATIENT)
Dept: ENDOCRINOLOGY | Facility: CLINIC | Age: 65
End: 2025-03-12
Payer: MEDICARE

## 2025-03-12 DIAGNOSIS — E11.9 TYPE 2 DIABETES MELLITUS W/OUT COMPLICATIONS: ICD-10-CM

## 2025-03-12 DIAGNOSIS — E78.5 HYPERLIPIDEMIA, UNSPECIFIED: ICD-10-CM

## 2025-03-12 PROCEDURE — G2211 COMPLEX E/M VISIT ADD ON: CPT | Mod: 2W

## 2025-03-12 PROCEDURE — 99214 OFFICE O/P EST MOD 30 MIN: CPT | Mod: 2W

## 2025-04-04 ENCOUNTER — RX RENEWAL (OUTPATIENT)
Age: 65
End: 2025-04-04

## 2025-04-21 ENCOUNTER — RX RENEWAL (OUTPATIENT)
Age: 65
End: 2025-04-21

## 2025-04-24 ENCOUNTER — APPOINTMENT (OUTPATIENT)
Dept: INTERNAL MEDICINE | Facility: CLINIC | Age: 65
End: 2025-04-24
Payer: MEDICARE

## 2025-04-24 VITALS
RESPIRATION RATE: 16 BRPM | SYSTOLIC BLOOD PRESSURE: 112 MMHG | HEIGHT: 72 IN | HEART RATE: 98 BPM | WEIGHT: 265 LBS | BODY MASS INDEX: 35.89 KG/M2 | DIASTOLIC BLOOD PRESSURE: 72 MMHG | OXYGEN SATURATION: 98 % | TEMPERATURE: 97.7 F

## 2025-04-24 DIAGNOSIS — Z00.00 ENCOUNTER FOR GENERAL ADULT MEDICAL EXAMINATION W/OUT ABNORMAL FINDINGS: ICD-10-CM

## 2025-04-24 DIAGNOSIS — I10 ESSENTIAL (PRIMARY) HYPERTENSION: ICD-10-CM

## 2025-04-24 DIAGNOSIS — E11.9 TYPE 2 DIABETES MELLITUS W/OUT COMPLICATIONS: ICD-10-CM

## 2025-04-24 DIAGNOSIS — E78.5 HYPERLIPIDEMIA, UNSPECIFIED: ICD-10-CM

## 2025-04-24 DIAGNOSIS — H40.9 UNSPECIFIED GLAUCOMA: ICD-10-CM

## 2025-04-24 PROCEDURE — G0439: CPT

## 2025-04-26 ENCOUNTER — LABORATORY RESULT (OUTPATIENT)
Age: 65
End: 2025-04-26

## 2025-04-27 LAB
ALBUMIN SERPL ELPH-MCNC: 4.6 G/DL
ALP BLD-CCNC: 85 U/L
ALT SERPL-CCNC: 40 U/L
ANION GAP SERPL CALC-SCNC: 15 MMOL/L
APPEARANCE: CLEAR
AST SERPL-CCNC: 27 U/L
BILIRUB SERPL-MCNC: 1 MG/DL
BILIRUBIN URINE: NEGATIVE
BLOOD URINE: NEGATIVE
BUN SERPL-MCNC: 19 MG/DL
CALCIUM SERPL-MCNC: 9.7 MG/DL
CHLORIDE SERPL-SCNC: 102 MMOL/L
CHOLEST SERPL-MCNC: 133 MG/DL
CO2 SERPL-SCNC: 24 MMOL/L
COLOR: YELLOW
CREAT SERPL-MCNC: 0.89 MG/DL
EGFRCR SERPLBLD CKD-EPI 2021: 95 ML/MIN/1.73M2
ESTIMATED AVERAGE GLUCOSE: 166 MG/DL
FOLATE SERPL-MCNC: 8.6 NG/ML
GLUCOSE QUALITATIVE U: NEGATIVE MG/DL
GLUCOSE SERPL-MCNC: 140 MG/DL
HBA1C MFR BLD HPLC: 7.4 %
HCT VFR BLD CALC: 42 %
HDLC SERPL-MCNC: 42 MG/DL
HGB BLD-MCNC: 14 G/DL
KETONES URINE: NEGATIVE MG/DL
LDLC SERPL-MCNC: 69 MG/DL
LEUKOCYTE ESTERASE URINE: NEGATIVE
MCHC RBC-ENTMCNC: 29 PG
MCHC RBC-ENTMCNC: 33.3 G/DL
MCV RBC AUTO: 87 FL
NITRITE URINE: NEGATIVE
NONHDLC SERPL-MCNC: 92 MG/DL
PH URINE: 6
PLATELET # BLD AUTO: 314 K/UL
POTASSIUM SERPL-SCNC: 4.2 MMOL/L
PROT SERPL-MCNC: 7.3 G/DL
PROTEIN URINE: 30 MG/DL
PSA SERPL-MCNC: 0.52 NG/ML
RBC # BLD: 4.83 M/UL
RBC # FLD: 13.1 %
SODIUM SERPL-SCNC: 141 MMOL/L
SPECIFIC GRAVITY URINE: 1.02
TRIGL SERPL-MCNC: 131 MG/DL
TSH SERPL-ACNC: 1.41 UIU/ML
UROBILINOGEN URINE: 1 MG/DL
VIT B12 SERPL-MCNC: 447 PG/ML
WBC # FLD AUTO: 9.02 K/UL

## 2025-05-08 ENCOUNTER — APPOINTMENT (OUTPATIENT)
Dept: CARDIOLOGY | Facility: CLINIC | Age: 65
End: 2025-05-08
Payer: MEDICARE

## 2025-05-08 ENCOUNTER — NON-APPOINTMENT (OUTPATIENT)
Age: 65
End: 2025-05-08

## 2025-05-08 VITALS
OXYGEN SATURATION: 97 % | SYSTOLIC BLOOD PRESSURE: 138 MMHG | HEIGHT: 72 IN | BODY MASS INDEX: 35.49 KG/M2 | WEIGHT: 262 LBS | DIASTOLIC BLOOD PRESSURE: 86 MMHG | HEART RATE: 83 BPM

## 2025-05-08 VITALS — SYSTOLIC BLOOD PRESSURE: 122 MMHG | DIASTOLIC BLOOD PRESSURE: 76 MMHG

## 2025-05-08 DIAGNOSIS — I49.3 VENTRICULAR PREMATURE DEPOLARIZATION: ICD-10-CM

## 2025-05-08 DIAGNOSIS — E78.5 HYPERLIPIDEMIA, UNSPECIFIED: ICD-10-CM

## 2025-05-08 PROCEDURE — 99214 OFFICE O/P EST MOD 30 MIN: CPT

## 2025-05-08 PROCEDURE — G2211 COMPLEX E/M VISIT ADD ON: CPT

## 2025-05-08 PROCEDURE — 93000 ELECTROCARDIOGRAM COMPLETE: CPT

## 2025-05-22 ENCOUNTER — NON-APPOINTMENT (OUTPATIENT)
Age: 65
End: 2025-05-22

## 2025-05-22 ENCOUNTER — APPOINTMENT (OUTPATIENT)
Dept: OPHTHALMOLOGY | Facility: CLINIC | Age: 65
End: 2025-05-22
Payer: MEDICARE

## 2025-05-22 ENCOUNTER — RX RENEWAL (OUTPATIENT)
Age: 65
End: 2025-05-22

## 2025-05-22 PROCEDURE — 65855 TRABECULOPLASTY LASER SURG: CPT | Mod: RT

## 2025-06-05 DIAGNOSIS — E11.9 TYPE 2 DIABETES MELLITUS W/OUT COMPLICATIONS: ICD-10-CM

## 2025-06-09 ENCOUNTER — RX RENEWAL (OUTPATIENT)
Age: 65
End: 2025-06-09

## 2025-07-01 ENCOUNTER — NON-APPOINTMENT (OUTPATIENT)
Age: 65
End: 2025-07-01

## 2025-07-01 ENCOUNTER — APPOINTMENT (OUTPATIENT)
Dept: OPHTHALMOLOGY | Facility: CLINIC | Age: 65
End: 2025-07-01
Payer: MEDICARE

## 2025-07-01 PROCEDURE — 65855 TRABECULOPLASTY LASER SURG: CPT | Mod: LT

## 2025-07-05 ENCOUNTER — RX RENEWAL (OUTPATIENT)
Age: 65
End: 2025-07-05

## 2025-08-06 ENCOUNTER — APPOINTMENT (OUTPATIENT)
Dept: OPHTHALMOLOGY | Facility: CLINIC | Age: 65
End: 2025-08-06
Payer: MEDICARE

## 2025-08-06 ENCOUNTER — NON-APPOINTMENT (OUTPATIENT)
Age: 65
End: 2025-08-06

## 2025-08-06 PROCEDURE — 92012 INTRM OPH EXAM EST PATIENT: CPT

## 2025-08-06 PROCEDURE — 92083 EXTENDED VISUAL FIELD XM: CPT

## 2025-08-06 PROCEDURE — 92133 CPTRZD OPH DX IMG PST SGM ON: CPT

## 2025-08-26 ENCOUNTER — RX RENEWAL (OUTPATIENT)
Age: 65
End: 2025-08-26

## 2025-08-26 RX ORDER — OLMESARTAN MEDOXOMIL 20 MG/1
20 TABLET, FILM COATED ORAL
Qty: 90 | Refills: 3 | Status: ACTIVE | COMMUNITY
Start: 2025-08-26

## 2025-09-03 ENCOUNTER — RX RENEWAL (OUTPATIENT)
Age: 65
End: 2025-09-03

## 2025-09-04 ENCOUNTER — RX RENEWAL (OUTPATIENT)
Age: 65
End: 2025-09-04

## (undated) DEVICE — SUCTION YANKAUER TAPERED BULBOUS NO VENT

## (undated) DEVICE — VALVE BIOPSY

## (undated) DEVICE — TUBE O2 SUPL CRUSH RESIS CONN SOUTHSIDE ONLY

## (undated) DEVICE — RETRIEVER ROTH NET PLATINUM-UNIVERSAL

## (undated) DEVICE — TRAP QUICK CATCH  SINGL CHAMBER

## (undated) DEVICE — CATH ELECHMSTAT  INJ 7FR 210CM

## (undated) DEVICE — FORMALIN CUPS 10% BUFFERED

## (undated) DEVICE — MASK O2 NON REBREATH 3IN1 ADULT

## (undated) DEVICE — SYR LUER SLIP TIP 30CC

## (undated) DEVICE — SUT HEWSON RETRIEVER

## (undated) DEVICE — SNARE LRG

## (undated) DEVICE — ENDOCUFF VISION SZ 3 SM PRPL

## (undated) DEVICE — TUBING SUCTION CONN 6FT STERILE

## (undated) DEVICE — TUBING CANNULA SALTER LABS NASAL ADULT 7FT

## (undated) DEVICE — CATH ELCTR GLIDE PRB 7FR

## (undated) DEVICE — SENSOR O2 FINGER XL ADULT 24/BX 6BX/CA

## (undated) DEVICE — PACK IV START WITH CHG

## (undated) DEVICE — STERIS DEFENDO 3-PIECE KIT (AIR/WATER, SUCTION & BIOPSY VALVES)

## (undated) DEVICE — CATH IV SAFE BC 22G X 1" (BLUE)

## (undated) DEVICE — CANISTER SUCTION 1200CC 10/SL

## (undated) DEVICE — SOL IRR NS 0.9% 250ML

## (undated) DEVICE — ENDOCUFF VISION SZ 2 LG GRN

## (undated) DEVICE — SET IV PUMP BLOOD 1VALVE 180FILTER NON-DEHP

## (undated) DEVICE — FORCEP RADIAL JAW 4 W NDL 2.4MM 2.8MM 240CM ORANGE DISP

## (undated) DEVICE — TUBING IV SET SECONDARY 34"

## (undated) DEVICE — Device

## (undated) DEVICE — SYR LUER SLIP TIP 50CC

## (undated) DEVICE — TUBE RECTAL 24FR

## (undated) DEVICE — MARKER ENDO SPOT EX

## (undated) DEVICE — TUBING IV SET GRAVITY 3Y 100" MACRO

## (undated) DEVICE — ELCTR GROUNDING PAD ADULT COVIDIEN

## (undated) DEVICE — MASK OXYGEN PANORAMIC

## (undated) DEVICE — BRUSH COLONOSCOPY CYTOLOGY

## (undated) DEVICE — FORCEP RADIAL JAW 4 JUMBO 2.8MM 3.2MM 240CM ORANGE DISP

## (undated) DEVICE — CATH IV SAFE BC 20G X 1.16" (PINK)

## (undated) DEVICE — SOL IRR POUR H2O 500ML

## (undated) DEVICE — SYR IV POSIFLUSH NS 3ML 30/TY

## (undated) DEVICE — POLY TRAP ETRAP

## (undated) DEVICE — SNARE POLYP SENS 27MM 240CM

## (undated) DEVICE — NDL INJ SCLERO INTERJECT 23G

## (undated) DEVICE — TRAP SPECIMEN SPUTUM 40CC

## (undated) DEVICE — CLAMP BX HOT RAD JAW 3